# Patient Record
Sex: MALE | Race: WHITE | Employment: OTHER
[De-identification: names, ages, dates, MRNs, and addresses within clinical notes are randomized per-mention and may not be internally consistent; named-entity substitution may affect disease eponyms.]

---

## 2018-07-18 LAB
ALT SERPL-CCNC: 24 IU/L (ref 6–40)
AST SERPL-CCNC: 24 IU/L (ref 6–40)
CHOLEST SERPL-MCNC: 196 MG/DL (ref 114–200)
GLUCOSE SERPL-MCNC: 92 MG/DL (ref 70–100)
HDLC SERPL-MCNC: 50 MG/DL (ref 40–60)
LDLC SERPL CALC-MCNC: 132 MG/DL
TRIGL SERPL-MCNC: 71 MG/DL (ref 10–200)
TSH SERPL-ACNC: 1.85 UIU/ML (ref 0.4–3.99)

## 2019-02-07 ENCOUNTER — RESULTS ONLY (OUTPATIENT)
Dept: LAB | Age: 59
End: 2019-02-07

## 2019-02-07 ENCOUNTER — TRANSFERRED RECORDS (OUTPATIENT)
Dept: HEALTH INFORMATION MANAGEMENT | Facility: CLINIC | Age: 59
End: 2019-02-07

## 2019-02-07 LAB
APPEARANCE FLD: NORMAL
COLOR FLD: NORMAL
CRYSTALS SNV MICRO: NORMAL
GRAM STN SPEC: NORMAL
GRAM STN SPEC: NORMAL
Lab: NORMAL
MONOS+MACROS NFR FLD MANUAL: 12 %
NEUTS BAND NFR FLD MANUAL: 88 %
RBC # FLD: NORMAL /UL
SPECIMEN SOURCE FLD: NORMAL
SPECIMEN SOURCE: NORMAL
WBC # FLD AUTO: NORMAL /UL

## 2019-02-21 LAB
BACTERIA SPEC CULT: NO GROWTH
Lab: NORMAL
SPECIMEN SOURCE: NORMAL

## 2019-05-07 ENCOUNTER — TRANSFERRED RECORDS (OUTPATIENT)
Dept: HEALTH INFORMATION MANAGEMENT | Facility: CLINIC | Age: 59
End: 2019-05-07

## 2019-05-07 LAB
CREAT SERPL-MCNC: 0.98 MG/DL (ref 0.7–1.2)
GFR SERPL CREATININE-BSD FRML MDRD: >60 ML/MIN/1.73M2
GLUCOSE SERPL-MCNC: 100 MG/DL (ref 70–99)
INR PPP: 0.9 (ref 0.9–1.1)
POTASSIUM SERPL-SCNC: 4.4 MEQ/L (ref 3.4–5.1)

## 2019-10-16 SDOH — HEALTH STABILITY: MENTAL HEALTH: HOW OFTEN DO YOU HAVE 6 OR MORE DRINKS ON ONE OCCASION?: WEEKLY

## 2019-10-16 SDOH — HEALTH STABILITY: MENTAL HEALTH: HOW MANY STANDARD DRINKS CONTAINING ALCOHOL DO YOU HAVE ON A TYPICAL DAY?: 1 OR 2

## 2019-10-16 NOTE — PROGRESS NOTES
Subjective     Fam West is a 59 year old male who presents to clinic today for the following health issues:    HPI   derm      Duration: couple months    Description (location/character/radiation): right forearm    Intensity:  mild    Accompanying signs and symptoms: sore with pressure and looks to have a white fluid under the skin.     History (similar episodes/previous evaluation): None    Precipitating or alleviating factors: None    Therapies tried and outcome: None       Patient Active Problem List   Diagnosis     Allergic rhinitis due to other allergen     Genital warts     Herpes simplex virus (HSV) infection     Hyperlipidemia     Primary localized osteoarthrosis of shoulder region     Past Surgical History:   Procedure Laterality Date     ARTHROPLASTY SHOULDER Left 2012     ARTHROPLASTY SHOULDER Left 2019    Removal of cement spacer, Dr Russell     ARTHROSCOPY KNEE Right 10/23/2000     AS REPAIR OF NASAL SEPTUM       COLONOSCOPY  2010    Dr Narayanan, repeat        Social History     Tobacco Use     Smoking status: Former Smoker     Packs/day: 0.00     Years: 15.00     Pack years: 0.00     Types: Cigars     Start date: 1985     Last attempt to quit: 2003     Years since quittin.8     Smokeless tobacco: Never Used     Tobacco comment: smoked cigars every now and then   Substance Use Topics     Alcohol use: Yes     Drinks per session: 1 or 2     Binge frequency: Weekly     Family History   Problem Relation Age of Onset     Diabetes Father          Current Outpatient Medications   Medication Sig Dispense Refill     [START ON 10/31/2019] imiquimod (ALDARA) 5 % external cream Apply topically twice a week 24 packet 11     valACYclovir (VALTREX) 500 MG tablet Take 1 tablet (500 mg) by mouth 2 times daily for 3 days 6 tablet 0     valACYclovir HCl (VALTREX PO)        Allergies   Allergen Reactions     Seasonal Allergies        PROBLEMS TO ADD ON...  Reviewed and updated  as needed this visit by Provider         Review of Systems   ROS COMP: Constitutional, HEENT, cardiovascular, pulmonary, gi and gu systems are negative, except as otherwise noted.      Objective    There were no vitals taken for this visit.  There is no height or weight on file to calculate BMI.  Physical Exam   GENERAL: healthy, alert and no distress  NECK: no adenopathy, no asymmetry, masses, or scars and thyroid normal to palpation  RESP: lungs clear to auscultation - no rales, rhonchi or wheezes  CV: regular rate and rhythm, normal S1 S2, no S3 or S4, no murmur, click or rub, no peripheral edema and peripheral pulses strong  ABDOMEN: soft, nontender, no hepatosplenomegaly, no masses and bowel sounds normal  MS: no gross musculoskeletal defects noted, no edema  SKIN: no suspicious lesions or rashes and small cystic lesion left forearm, and small keratotic lesion right forearm.     Diagnostic Test Results:  Labs reviewed in Epic        Assessment & Plan       ICD-10-CM    1. Skin lesion L98.9 imiquimod (ALDARA) 5 % external cream   2. Herpes simplex virus infection B00.9 valACYclovir (VALTREX) 500 MG tablet   3. Hyperlipidemia, unspecified hyperlipidemia type E78.5 Lipid Profile (Chol, Trig, HDL, LDL calc)     Comprehensive metabolic panel (BMP + Alb, Alk Phos, ALT, AST, Total. Bili, TP)   4. Special screening for malignant neoplasm of prostate Z12.5 PSA, screen     Review today.  45 minutes spent with patient over 50% in counseling about his health cares moving forward, his skin lesions, ongoing medicines (remote hx of genital herpes in the 80's; suppression with aldara twice weekly and valtrex on hand for flare).  Observe the 2 above skin lesions.  They have been there for a long time.  Talked about health maintenance, etc.  He is setting up for a physical and will have labs before.  His lipids were high a few years ago and will be rechecked.     BMI:   Estimated body mass index is 25.92 kg/m  as calculated from  "the following:    Height as of this encounter: 1.77 m (5' 9.69\").    Weight as of this encounter: 81.2 kg (179 lb).               No follow-ups on file.    Anant Evans MD  [facility]      "

## 2019-10-29 ENCOUNTER — OFFICE VISIT (OUTPATIENT)
Dept: FAMILY MEDICINE | Facility: OTHER | Age: 59
End: 2019-10-29
Attending: FAMILY MEDICINE
Payer: COMMERCIAL

## 2019-10-29 VITALS
HEART RATE: 77 BPM | DIASTOLIC BLOOD PRESSURE: 90 MMHG | HEIGHT: 70 IN | SYSTOLIC BLOOD PRESSURE: 144 MMHG | OXYGEN SATURATION: 95 % | BODY MASS INDEX: 25.62 KG/M2 | WEIGHT: 179 LBS

## 2019-10-29 DIAGNOSIS — E78.5 HYPERLIPIDEMIA, UNSPECIFIED HYPERLIPIDEMIA TYPE: ICD-10-CM

## 2019-10-29 DIAGNOSIS — Z12.5 SPECIAL SCREENING FOR MALIGNANT NEOPLASM OF PROSTATE: ICD-10-CM

## 2019-10-29 DIAGNOSIS — B00.9 HERPES SIMPLEX VIRUS INFECTION: ICD-10-CM

## 2019-10-29 DIAGNOSIS — L98.9 SKIN LESION: Primary | ICD-10-CM

## 2019-10-29 PROCEDURE — 99204 OFFICE O/P NEW MOD 45 MIN: CPT | Performed by: FAMILY MEDICINE

## 2019-10-29 RX ORDER — IMIQUIMOD 12.5 MG/.25G
CREAM TOPICAL
Qty: 24 PACKET | Refills: 11 | Status: SHIPPED | OUTPATIENT
Start: 2019-10-31 | End: 2020-10-29

## 2019-10-29 RX ORDER — VALACYCLOVIR HYDROCHLORIDE 500 MG/1
500 TABLET, FILM COATED ORAL 2 TIMES DAILY
Qty: 6 TABLET | Refills: 0 | Status: SHIPPED | OUTPATIENT
Start: 2019-10-29 | End: 2019-12-05

## 2019-10-29 RX ORDER — IMIQUIMOD 12.5 MG/.25G
CREAM TOPICAL
COMMUNITY
Start: 2019-06-09 | End: 2019-10-29

## 2019-10-29 ASSESSMENT — MIFFLIN-ST. JEOR: SCORE: 1628.19

## 2019-10-29 ASSESSMENT — PAIN SCALES - GENERAL: PAINLEVEL: NO PAIN (0)

## 2019-10-29 NOTE — NURSING NOTE
"Chief Complaint   Patient presents with     Establish Care       Initial BP (!) 156/104   Pulse 77   Ht 1.77 m (5' 9.69\")   Wt 81.2 kg (179 lb)   SpO2 95%   BMI 25.92 kg/m   Estimated body mass index is 25.92 kg/m  as calculated from the following:    Height as of this encounter: 1.77 m (5' 9.69\").    Weight as of this encounter: 81.2 kg (179 lb).  Medication Reconciliation: complete  Kaci Rae MA    "

## 2019-11-12 DIAGNOSIS — Z12.5 SPECIAL SCREENING FOR MALIGNANT NEOPLASM OF PROSTATE: ICD-10-CM

## 2019-11-12 DIAGNOSIS — E78.5 HYPERLIPIDEMIA, UNSPECIFIED HYPERLIPIDEMIA TYPE: ICD-10-CM

## 2019-11-12 LAB
ALBUMIN SERPL-MCNC: 4.3 G/DL (ref 3.4–5)
ALP SERPL-CCNC: 68 U/L (ref 40–150)
ALT SERPL W P-5'-P-CCNC: 32 U/L (ref 0–70)
ANION GAP SERPL CALCULATED.3IONS-SCNC: 3 MMOL/L (ref 3–14)
AST SERPL W P-5'-P-CCNC: 19 U/L (ref 0–45)
BILIRUB SERPL-MCNC: 0.5 MG/DL (ref 0.2–1.3)
BUN SERPL-MCNC: 16 MG/DL (ref 7–30)
CALCIUM SERPL-MCNC: 9.5 MG/DL (ref 8.5–10.1)
CHLORIDE SERPL-SCNC: 103 MMOL/L (ref 94–109)
CHOLEST SERPL-MCNC: 260 MG/DL
CO2 SERPL-SCNC: 30 MMOL/L (ref 20–32)
CREAT SERPL-MCNC: 0.94 MG/DL (ref 0.66–1.25)
GFR SERPL CREATININE-BSD FRML MDRD: 87 ML/MIN/{1.73_M2}
GLUCOSE SERPL-MCNC: 101 MG/DL (ref 70–99)
HDLC SERPL-MCNC: 65 MG/DL
LDLC SERPL CALC-MCNC: 172 MG/DL
NONHDLC SERPL-MCNC: 195 MG/DL
POTASSIUM SERPL-SCNC: 4.2 MMOL/L (ref 3.4–5.3)
PROT SERPL-MCNC: 8.3 G/DL (ref 6.8–8.8)
PSA SERPL-ACNC: 0.48 UG/L (ref 0–4)
SODIUM SERPL-SCNC: 136 MMOL/L (ref 133–144)
TRIGL SERPL-MCNC: 117 MG/DL

## 2019-11-12 PROCEDURE — 80053 COMPREHEN METABOLIC PANEL: CPT | Performed by: FAMILY MEDICINE

## 2019-11-12 PROCEDURE — 36415 COLL VENOUS BLD VENIPUNCTURE: CPT | Performed by: FAMILY MEDICINE

## 2019-11-12 PROCEDURE — G0103 PSA SCREENING: HCPCS | Performed by: FAMILY MEDICINE

## 2019-11-12 PROCEDURE — 80061 LIPID PANEL: CPT | Performed by: FAMILY MEDICINE

## 2019-11-25 NOTE — PROGRESS NOTES
3  SUBJECTIVE:   CC: Fam West is an 59 year old male who presents for preventive health visit.     Healthy Habits:    Do you get at least three servings of calcium containing foods daily (dairy, green leafy vegetables, etc.)? yes    Amount of exercise or daily activities, outside of work: 3 day(s) per week    Problems taking medications regularly not applicable    Medication side effects: No    Have you had an eye exam in the past two years? yes    Do you see a dentist twice per year? yes    Do you have sleep apnea, excessive snoring or daytime drowsiness?snoring        Today's PHQ-2 Score:   PHQ-2 (  [employer]) 2019 10/29/2019   Q1: Little interest or pleasure in doing things 0 0   Q2: Feeling down, depressed or hopeless 0 0   PHQ-2 Score 0 0       Abuse: Current or Past(Physical, Sexual or Emotional)- No  Do you feel safe in your environment? Yes    Have you ever done Advance Care Planning? (For example, a Health Directive, POLST, or a discussion with a medical provider or your loved ones about your wishes): Yes, patient states has an Advance Care Planning document and will bring a copy to the clinic.    Social History     Tobacco Use     Smoking status: Former Smoker     Packs/day: 0.00     Years: 15.00     Pack years: 0.00     Types: Cigars     Start date: 1985     Last attempt to quit: 2003     Years since quittin.9     Smokeless tobacco: Never Used     Tobacco comment: smoked cigars every now and then   Substance Use Topics     Alcohol use: Yes     Drinks per session: 1 or 2     Binge frequency: Weekly     If you drink alcohol do you typically have >3 drinks per day or >7 drinks per week? No                      Last PSA:   PSA   Date Value Ref Range Status   2019 0.48 0 - 4 ug/L Final     Comment:     Assay Method:  Chemiluminescence using Siemens Vista analyzer       Reviewed orders with patient. Reviewed health maintenance and updated orders accordingly - Yes  Labs  "reviewed in EPIC    Reviewed and updated as needed this visit by clinical staff  Tobacco  Allergies  Meds         Reviewed and updated as needed this visit by Provider        No past medical history on file.   Past Surgical History:   Procedure Laterality Date     ARTHROPLASTY SHOULDER Left 04/03/2012     ARTHROPLASTY SHOULDER Left 05/13/2019    Removal of cement spacer, Dr Russell     ARTHROSCOPY KNEE Right 10/23/2000     AS REPAIR OF NASAL SEPTUM       COLONOSCOPY  04/09/2010    Dr Narayanan, repeat 2020       ROS:  CONSTITUTIONAL: NEGATIVE for fever, chills, change in weight  INTEGUMENTARY/SKIN: NEGATIVE for worrisome rashes, moles or lesions  EYES: NEGATIVE for vision changes or irritation  ENT: NEGATIVE for ear, mouth and throat problems  RESP: NEGATIVE for significant cough or SOB  CV: NEGATIVE for chest pain, palpitations or peripheral edema  GI: NEGATIVE for nausea, abdominal pain, heartburn, or change in bowel habits   male: negative for dysuria, hematuria, decreased urinary stream, erectile dysfunction, urethral discharge  MUSCULOSKELETAL: NEGATIVE for significant arthralgias or myalgia  NEURO: NEGATIVE for weakness, dizziness or paresthesias  PSYCHIATRIC: NEGATIVE for changes in mood or affect    OBJECTIVE:   /84   Pulse 71   Ht 1.791 m (5' 10.5\")   Wt 83.9 kg (185 lb)   SpO2 97%   BMI 26.17 kg/m    EXAM:  GENERAL: healthy, alert and no distress  EYES: Eyes grossly normal to inspection, PERRL and conjunctivae and sclerae normal  HENT: ear canals and TM's normal, nose and mouth without ulcers or lesions  NECK: no adenopathy, no asymmetry, masses, or scars and thyroid normal to palpation  RESP: lungs clear to auscultation - no rales, rhonchi or wheezes  CV: regular rate and rhythm, normal S1 S2, no S3 or S4, no murmur, click or rub, no peripheral edema and peripheral pulses strong  ABDOMEN: soft, nontender, no hepatosplenomegaly, no masses and bowel sounds normal   (male): normal male " "genitalia without lesions or urethral discharge, no hernia  RECTAL: normal sphincter tone, no rectal masses, prostate normal size, smooth, nontender without nodules or masses  MS: no gross musculoskeletal defects noted, no edema  SKIN: no suspicious lesions or rashes  NEURO: Normal strength and tone, mentation intact and speech normal  PSYCH: mentation appears normal, affect normal/bright    Diagnostic Test Results:  Labs reviewed in Epic    ASSESSMENT/PLAN:       ICD-10-CM    1. Routine general medical examination at a health care facility Z00.00    2. Need for hepatitis C screening test Z11.59    3. Screening for HIV (human immunodeficiency virus) Z11.4    4. Hyperlipidemia, unspecified hyperlipidemia type E78.5 Lipid Profile (Chol, Trig, HDL, LDL calc)       COUNSELING:  Reviewed preventive health counseling, as reflected in patient instructions    Estimated body mass index is 26.17 kg/m  as calculated from the following:    Height as of this encounter: 1.791 m (5' 10.5\").    Weight as of this encounter: 83.9 kg (185 lb).         reports that he quit smoking about 16 years ago. His smoking use included cigars. He started smoking about 34 years ago. He smoked 0.00 packs per day for 15.00 years. He has never used smokeless tobacco.      Counseling Resources:  ATP IV Guidelines  Pooled Cohorts Equation Calculator  FRAX Risk Assessment  ICSI Preventive Guidelines  Dietary Guidelines for Americans, 2010  USDA's MyPlate  ASA Prophylaxis  Lung CA Screening    Anant Evans MD  [facility]  "

## 2019-12-05 ENCOUNTER — OFFICE VISIT (OUTPATIENT)
Dept: FAMILY MEDICINE | Facility: OTHER | Age: 59
End: 2019-12-05
Attending: FAMILY MEDICINE
Payer: COMMERCIAL

## 2019-12-05 VITALS
BODY MASS INDEX: 25.9 KG/M2 | HEART RATE: 71 BPM | DIASTOLIC BLOOD PRESSURE: 84 MMHG | HEIGHT: 71 IN | SYSTOLIC BLOOD PRESSURE: 128 MMHG | OXYGEN SATURATION: 97 % | WEIGHT: 185 LBS

## 2019-12-05 DIAGNOSIS — E78.5 HYPERLIPIDEMIA, UNSPECIFIED HYPERLIPIDEMIA TYPE: ICD-10-CM

## 2019-12-05 DIAGNOSIS — Z00.00 ROUTINE GENERAL MEDICAL EXAMINATION AT A HEALTH CARE FACILITY: Primary | ICD-10-CM

## 2019-12-05 DIAGNOSIS — Z11.59 NEED FOR HEPATITIS C SCREENING TEST: ICD-10-CM

## 2019-12-05 DIAGNOSIS — Z11.4 SCREENING FOR HIV (HUMAN IMMUNODEFICIENCY VIRUS): ICD-10-CM

## 2019-12-05 PROCEDURE — 99396 PREV VISIT EST AGE 40-64: CPT | Performed by: FAMILY MEDICINE

## 2019-12-05 ASSESSMENT — MIFFLIN-ST. JEOR: SCORE: 1668.34

## 2019-12-05 ASSESSMENT — PAIN SCALES - GENERAL: PAINLEVEL: NO PAIN (0)

## 2019-12-05 NOTE — NURSING NOTE
"Chief Complaint   Patient presents with     Physical       Initial /84   Pulse 71   Ht 1.791 m (5' 10.5\")   Wt 83.9 kg (185 lb)   SpO2 97%   BMI 26.17 kg/m   Estimated body mass index is 26.17 kg/m  as calculated from the following:    Height as of this encounter: 1.791 m (5' 10.5\").    Weight as of this encounter: 83.9 kg (185 lb).  Medication Reconciliation: complete  Linda Tran LPN  "

## 2020-10-29 DIAGNOSIS — L98.9 SKIN LESION: ICD-10-CM

## 2020-10-29 RX ORDER — IMIQUIMOD 12.5 MG/.25G
CREAM TOPICAL
Qty: 24 PACKET | Refills: 11 | Status: SHIPPED | OUTPATIENT
Start: 2020-10-29 | End: 2022-01-19

## 2020-10-29 NOTE — TELEPHONE ENCOUNTER
Rhonda      Last Written Prescription Date:  10.31.19  Last Fill Quantity: 24 packets,   # refills: 11  Last Office Visit: 12.05.19

## 2022-01-17 ENCOUNTER — LAB (OUTPATIENT)
Dept: LAB | Facility: OTHER | Age: 62
End: 2022-01-17
Payer: COMMERCIAL

## 2022-01-17 DIAGNOSIS — Z00.00 HEALTHCARE MAINTENANCE: ICD-10-CM

## 2022-01-17 DIAGNOSIS — Z12.5 SCREENING FOR PROSTATE CANCER: ICD-10-CM

## 2022-01-17 DIAGNOSIS — E78.5 HYPERLIPIDEMIA, UNSPECIFIED HYPERLIPIDEMIA TYPE: Primary | ICD-10-CM

## 2022-01-17 DIAGNOSIS — E78.5 HYPERLIPIDEMIA, UNSPECIFIED HYPERLIPIDEMIA TYPE: ICD-10-CM

## 2022-01-17 LAB
ALBUMIN SERPL-MCNC: 3.8 G/DL (ref 3.4–5)
ALP SERPL-CCNC: 69 U/L (ref 40–150)
ALT SERPL W P-5'-P-CCNC: 38 U/L (ref 0–70)
ANION GAP SERPL CALCULATED.3IONS-SCNC: 7 MMOL/L (ref 3–14)
AST SERPL W P-5'-P-CCNC: 23 U/L (ref 0–45)
BILIRUB SERPL-MCNC: 0.5 MG/DL (ref 0.2–1.3)
BUN SERPL-MCNC: 21 MG/DL (ref 7–30)
CALCIUM SERPL-MCNC: 9 MG/DL (ref 8.5–10.1)
CHLORIDE BLD-SCNC: 102 MMOL/L (ref 94–109)
CHOLEST SERPL-MCNC: 210 MG/DL
CO2 SERPL-SCNC: 28 MMOL/L (ref 20–32)
CREAT SERPL-MCNC: 1.01 MG/DL (ref 0.66–1.25)
ERYTHROCYTE [DISTWIDTH] IN BLOOD BY AUTOMATED COUNT: 12.6 % (ref 10–15)
FASTING STATUS PATIENT QL REPORTED: YES
GFR SERPL CREATININE-BSD FRML MDRD: 84 ML/MIN/1.73M2
GLUCOSE BLD-MCNC: 86 MG/DL (ref 70–99)
HCT VFR BLD AUTO: 49.2 % (ref 40–53)
HDLC SERPL-MCNC: 53 MG/DL
HGB BLD-MCNC: 16.8 G/DL (ref 13.3–17.7)
LDLC SERPL CALC-MCNC: 145 MG/DL
MCH RBC QN AUTO: 30.8 PG (ref 26.5–33)
MCHC RBC AUTO-ENTMCNC: 34.1 G/DL (ref 31.5–36.5)
MCV RBC AUTO: 90 FL (ref 78–100)
NONHDLC SERPL-MCNC: 157 MG/DL
PLATELET # BLD AUTO: 207 10E3/UL (ref 150–450)
POTASSIUM BLD-SCNC: 3.9 MMOL/L (ref 3.4–5.3)
PROT SERPL-MCNC: 7.6 G/DL (ref 6.8–8.8)
PSA SERPL-MCNC: 0.66 UG/L (ref 0–4)
RBC # BLD AUTO: 5.45 10E6/UL (ref 4.4–5.9)
SODIUM SERPL-SCNC: 137 MMOL/L (ref 133–144)
TRIGL SERPL-MCNC: 61 MG/DL
WBC # BLD AUTO: 3.3 10E3/UL (ref 4–11)

## 2022-01-17 PROCEDURE — G0103 PSA SCREENING: HCPCS

## 2022-01-17 PROCEDURE — 80061 LIPID PANEL: CPT

## 2022-01-17 PROCEDURE — 87389 HIV-1 AG W/HIV-1&-2 AB AG IA: CPT

## 2022-01-17 PROCEDURE — 85027 COMPLETE CBC AUTOMATED: CPT

## 2022-01-17 PROCEDURE — 80053 COMPREHEN METABOLIC PANEL: CPT

## 2022-01-17 PROCEDURE — 36415 COLL VENOUS BLD VENIPUNCTURE: CPT

## 2022-01-17 PROCEDURE — 86803 HEPATITIS C AB TEST: CPT

## 2022-01-18 LAB
HCV AB SERPL QL IA: NONREACTIVE
HIV 1+2 AB+HIV1 P24 AG SERPL QL IA: NONREACTIVE

## 2022-01-19 ENCOUNTER — OFFICE VISIT (OUTPATIENT)
Dept: FAMILY MEDICINE | Facility: OTHER | Age: 62
End: 2022-01-19
Attending: FAMILY MEDICINE
Payer: COMMERCIAL

## 2022-01-19 VITALS
HEART RATE: 92 BPM | SYSTOLIC BLOOD PRESSURE: 118 MMHG | HEIGHT: 70 IN | BODY MASS INDEX: 25.34 KG/M2 | DIASTOLIC BLOOD PRESSURE: 78 MMHG | TEMPERATURE: 98 F | WEIGHT: 177 LBS | OXYGEN SATURATION: 97 %

## 2022-01-19 DIAGNOSIS — L98.9 SKIN LESION: ICD-10-CM

## 2022-01-19 DIAGNOSIS — Z12.11 COLON CANCER SCREENING: ICD-10-CM

## 2022-01-19 DIAGNOSIS — Z12.5 SCREENING FOR PROSTATE CANCER: ICD-10-CM

## 2022-01-19 DIAGNOSIS — A63.0 GENITAL WARTS: ICD-10-CM

## 2022-01-19 DIAGNOSIS — E78.5 HYPERLIPIDEMIA, UNSPECIFIED HYPERLIPIDEMIA TYPE: ICD-10-CM

## 2022-01-19 DIAGNOSIS — D70.9 NEUTROPENIA, UNSPECIFIED TYPE (H): ICD-10-CM

## 2022-01-19 DIAGNOSIS — Z00.00 ROUTINE GENERAL MEDICAL EXAMINATION AT A HEALTH CARE FACILITY: Primary | ICD-10-CM

## 2022-01-19 PROCEDURE — 99396 PREV VISIT EST AGE 40-64: CPT | Performed by: FAMILY MEDICINE

## 2022-01-19 RX ORDER — IMIQUIMOD 12.5 MG/.25G
CREAM TOPICAL
Qty: 24 PACKET | Refills: 11 | Status: SHIPPED | OUTPATIENT
Start: 2022-01-20 | End: 2023-02-14

## 2022-01-19 ASSESSMENT — MIFFLIN-ST. JEOR: SCORE: 1601.18

## 2022-01-19 ASSESSMENT — ANXIETY QUESTIONNAIRES
1. FEELING NERVOUS, ANXIOUS, OR ON EDGE: NOT AT ALL
IF YOU CHECKED OFF ANY PROBLEMS ON THIS QUESTIONNAIRE, HOW DIFFICULT HAVE THESE PROBLEMS MADE IT FOR YOU TO DO YOUR WORK, TAKE CARE OF THINGS AT HOME, OR GET ALONG WITH OTHER PEOPLE: NOT DIFFICULT AT ALL
5. BEING SO RESTLESS THAT IT IS HARD TO SIT STILL: NOT AT ALL
7. FEELING AFRAID AS IF SOMETHING AWFUL MIGHT HAPPEN: NOT AT ALL
3. WORRYING TOO MUCH ABOUT DIFFERENT THINGS: NOT AT ALL
6. BECOMING EASILY ANNOYED OR IRRITABLE: NOT AT ALL
GAD7 TOTAL SCORE: 0
2. NOT BEING ABLE TO STOP OR CONTROL WORRYING: NOT AT ALL

## 2022-01-19 ASSESSMENT — PAIN SCALES - GENERAL: PAINLEVEL: NO PAIN (0)

## 2022-01-19 ASSESSMENT — PATIENT HEALTH QUESTIONNAIRE - PHQ9
5. POOR APPETITE OR OVEREATING: NOT AT ALL
SUM OF ALL RESPONSES TO PHQ QUESTIONS 1-9: 2

## 2022-01-19 NOTE — PATIENT INSTRUCTIONS
Patient Education     Genital HPV: Diagnosis and Treatment  HPV (human papillomavirus)is a virus. Genital HPV is often found during a routine exam. Your healthcare provider may ask if you are sexually active, and if you have had abnormal development of cells (dysplasia) or genital warts before. You may also be checked for signs of other sexually transmitted infections. Genital HPV can t be removed completely from the body, but it can be treated.   Your exam    A Pap test can show signs of dysplasia or warts on the cervix or in the anus. A sample of cells is taken and viewed under a microscope.    A colposcopy may be done to assess dysplasia more closely. A magnifying scope (colposcope) is used to look at the cervix through the vagina. In the anus, this test is called high-resolution anoscopy (HRA).    An acetowhite test makes HPV disease (dysplasia or warts) easier to see. Vinegar is put on the cervix or anal skin that may be affected. If dysplasia or warts are present, they turn white. This test may be done during a colposcopy.    An HPV DNA test can be used to help diagnose HPV infection and find out which strain of HPV you have. Certain strains of HPV put you at higher risk of developing cancer in the future. A positive or negative HPV DNA test will be used to decide on your treatment plan.    If you have genital warts  The strains of HPV that cause warts are often not the same strains that lead to cancer. If you have genital warts, report them to your healthcare provider. Be aware that genital warts:     Can appear alone or in groups, and may be hard to see    May feel like dry, firm bumps and look like a rash    May look different on skin than on mucous membrane    May look different on a woman than on a man    Can also involve the anus and skin around the anus. Warts or cancer can develop at these sites if HPV is not diagnosed and treated.  Your treatment  No treatment can completely rid your body of HPV,  but it can be treated well in most cases Treating dysplasia removes the cells that can lead to cancer. Treating warts may help to keep them under control and they may not come back. Treatment will also help you to prevent spreading the virus to others.   Types of treatment    Dysplasia or warts can be removed with heat (cautery), freezing, or laser. The procedure is done by your healthcare provider, usually in the doctor's office. The number of treatments you need depends on how much tissue must be removed.    Medicines can be applied to treat external warts. Some medicines prompt your immune system to fight HPV. Others are substances that destroy warts. Medicines may be applied at the healthcare provider's office or at home.    Other treatments are being developed as more is learned about HPV.    Ways to stay healthy  Although some strains of HPV are linked to cervical cancer, most people with HPV don t develop cancer. Following up with your healthcare provider helps reduce the cancer risk even more.   Follow-up care    Schedule follow-up visits as instructed. See your healthcare provider if you notice any new warts.    Have Pap tests as often as your healthcare provider tells you to. This way any dysplasia is found early, when treatment works best and cancer can be prevented.    Keep your immune system strong    Don t smoke. Smoking weakens the immune system, which makes you more likely to get HPV. Smoking also increases the risk for cervical and other types of cancer.    To protect against HPV  If you have sex, the best way to prevent the spread of HPV is to use a latex condom every time. But remember that condoms and other barriers only protect the skin they cover. If you re with someone new, talk about HPV and other sexually transmitted infections before you have sex. If you re in a committed relationship and aren t currently using condoms, you may not need to change your habits. Talk to your partner and make a  choice that feels right to both of you.   One of the best ways to prevent HPV is to make sure you have had the HPV vaccine. It may be given up to age 26 if it not given earlier. If you are older than 27 and have not been vaccinated, talk with your healthcare provider to see if you may need the vaccine. In some cases, it may be offered from ages 27 to 45.   ???? last reviewed this educational content on 6/1/2020 2000-2021 [employer]. All rights reserved. This information is not intended as a substitute for professional medical care. Always follow your healthcare professional's instructions.

## 2022-01-19 NOTE — PROGRESS NOTES
SUBJECTIVE:   CC: Fam West is an 62 year old male who presents for preventative health visit.       Patient has been advised of split billing requirements and indicates understanding: Yes  Healthy Habits:     Getting at least 3 servings of Calcium per day:  NO    Bi-annual eye exam:  Yes    Dental care twice a year:  Yes    Sleep apnea or symptoms of sleep apnea:  Excessive snoring    Diet:  Regular (no restrictions)    Frequency of exercise:  6-7 days/week    Duration of exercise:  45-60 minutes    Taking medications regularly:  Not Applicable    Medication side effects:  Not applicable    PHQ-2 Total Score: 0    Additional concerns today:  No              Today's PHQ-2 Score:   PHQ-2 (  [employer]) 2022   Q1: Little interest or pleasure in doing things 0   Q2: Feeling down, depressed or hopeless 0   PHQ-2 Score 0   PHQ-2 Total Score (12-17 Years)- Positive if 3 or more points; Administer PHQ-A if positive -   Q1: Little interest or pleasure in doing things Not at all   Q2: Feeling down, depressed or hopeless Not at all   PHQ-2 Score 0       Abuse: Current or Past(Physical, Sexual or Emotional)- No  Do you feel safe in your environment? Yes        Social History     Tobacco Use     Smoking status: Former Smoker     Packs/day: 1.00     Years: 15.00     Pack years: 15.00     Types: Cigars     Start date: 1985     Quit date: 2003     Years since quittin.0     Smokeless tobacco: Never Used     Tobacco comment: smoked cigars every now and then   Substance Use Topics     Alcohol use: Yes         Alcohol Use 2022   Prescreen: >3 drinks/day or >7 drinks/week? No       Last PSA:   PSA   Date Value Ref Range Status   2019 0.48 0 - 4 ug/L Final     Comment:     Assay Method:  Chemiluminescence using Siemens Vista analyzer     Prostate Specific Antigen Screen   Date Value Ref Range Status   2022 0.66 0.00 - 4.00 ug/L Final       Reviewed orders with patient. Reviewed health  "maintenance and updated orders accordingly -   Lab work is in process    Reviewed and updated as needed this visit by clinical staff  Tobacco   Meds             Reviewed and updated as needed this visit by Provider               No past medical history on file.   Past Surgical History:   Procedure Laterality Date     ARTHROPLASTY SHOULDER Left 04/03/2012     ARTHROPLASTY SHOULDER Left 05/13/2019    Removal of cement spacer, Dr Russell     ARTHROSCOPY KNEE Right 10/23/2000     AS REPAIR OF NASAL SEPTUM       COLONOSCOPY  04/09/2010    Dr Narayanan, repeat 2020       Review of Systems  CONSTITUTIONAL: NEGATIVE for fever, chills, change in weight  INTEGUMENTARY/SKIN: NEGATIVE for worrisome rashes, moles or lesions  EYES: NEGATIVE for vision changes or irritation  ENT: NEGATIVE for ear, mouth and throat problems  RESP: NEGATIVE for significant cough or SOB  CV: NEGATIVE for chest pain, palpitations or peripheral edema  GI: NEGATIVE for nausea, abdominal pain, heartburn, or change in bowel habits   male: negative for dysuria, hematuria, decreased urinary stream, erectile dysfunction, urethral discharge  MUSCULOSKELETAL: NEGATIVE for significant arthralgias or myalgia  NEURO: NEGATIVE for weakness, dizziness or paresthesias  PSYCHIATRIC: NEGATIVE for changes in mood or affect    OBJECTIVE:   /78   Pulse 92   Temp 98  F (36.7  C)   Ht 1.765 m (5' 9.5\")   Wt 80.3 kg (177 lb)   SpO2 97%   BMI 25.76 kg/m      Physical Exam  GENERAL: healthy, alert and no distress  EYES: Eyes grossly normal to inspection, PERRL and conjunctivae and sclerae normal  HENT: ear canals and TM's normal, nose and mouth without ulcers or lesions  NECK: no adenopathy, no asymmetry, masses, or scars and thyroid normal to palpation  RESP: lungs clear to auscultation - no rales, rhonchi or wheezes  CV: regular rate and rhythm, normal S1 S2, no S3 or S4, no murmur, click or rub, no peripheral edema and peripheral pulses strong  ABDOMEN: " "soft, nontender, no hepatosplenomegaly, no masses and bowel sounds normal   (male): normal male genitalia without lesions or urethral discharge, no hernia  RECTAL: normal sphincter tone, no rectal masses, prostate normal size, smooth, nontender without nodules or masses  MS: no gross musculoskeletal defects noted, no edema  SKIN: no suspicious lesions or rashes  NEURO: Normal strength and tone, mentation intact and speech normal  PSYCH: mentation appears normal, affect normal/bright    Diagnostic Test Results:  Labs reviewed in Epic    ASSESSMENT/PLAN:       ICD-10-CM    1. Routine general medical examination at a health care facility  Z00.00    2. Hyperlipidemia, unspecified hyperlipidemia type  E78.5 Lipid Profile (Chol, Trig, HDL, LDL calc)     Comprehensive metabolic panel (BMP + Alb, Alk Phos, ALT, AST, Total. Bili, TP)   3. Genital warts  A63.0    4. Neutropenia, unspecified type (H)  D70.9 CBC with platelets and differential   5. Colon cancer screening  Z12.11 COLOGUARD([employer])   6. Screening for prostate cancer  Z12.5 PSA, screen           COUNSELING:   Reviewed preventive health counseling, as reflected in patient instructions    Estimated body mass index is 25.76 kg/m  as calculated from the following:    Height as of this encounter: 1.765 m (5' 9.5\").    Weight as of this encounter: 80.3 kg (177 lb).         He reports that he quit smoking about 19 years ago. His smoking use included cigars. He started smoking about 37 years ago. He has a 15.00 pack-year smoking history. He has never used smokeless tobacco.      Counseling Resources:  ATP IV Guidelines  Pooled Cohorts Equation Calculator  FRAX Risk Assessment  ICSI Preventive Guidelines  Dietary Guidelines for Americans, 2010  USDA's MyPlate  ASA Prophylaxis  Lung CA Screening    Anant Evans MD  [facility]  "

## 2022-01-19 NOTE — NURSING NOTE
"Chief Complaint   Patient presents with     Physical       Initial /78   Pulse 92   Temp 98  F (36.7  C)   Ht 1.765 m (5' 9.5\")   Wt 80.3 kg (177 lb)   SpO2 97%   BMI 25.76 kg/m   Estimated body mass index is 25.76 kg/m  as calculated from the following:    Height as of this encounter: 1.765 m (5' 9.5\").    Weight as of this encounter: 80.3 kg (177 lb).  Medication Reconciliation: complete  Linda Tran, LPN  "

## 2022-01-19 NOTE — LETTER
February 7, 2022      Fam ANGEL Jenna  [address]  [place] [address]        Dear ,    We are writing to inform you of your test results.    Cologuard was negative, repeat in 3 years.    Resulted Orders   COLOGUARD([employer])   Result Value Ref Range    COLOGUARD-ABSTRACT Negative        If you have any questions or concerns, please call the clinic at the number listed above.       Sincerely,      Anant Evans MD

## 2022-01-20 ASSESSMENT — ANXIETY QUESTIONNAIRES: GAD7 TOTAL SCORE: 0

## 2022-01-22 ENCOUNTER — HEALTH MAINTENANCE LETTER (OUTPATIENT)
Age: 62
End: 2022-01-22

## 2022-01-31 LAB — COLOGUARD-ABSTRACT: NEGATIVE

## 2022-09-04 ENCOUNTER — HEALTH MAINTENANCE LETTER (OUTPATIENT)
Age: 62
End: 2022-09-04

## 2023-02-13 DIAGNOSIS — L98.9 SKIN LESION: ICD-10-CM

## 2023-02-14 RX ORDER — IMIQUIMOD 12.5 MG/.25G
CREAM TOPICAL
Qty: 24 EACH | Refills: 11 | Status: SHIPPED | OUTPATIENT
Start: 2023-02-16

## 2023-02-14 NOTE — TELEPHONE ENCOUNTER
imiquimod (ALDARA) 5 % external cream   Last Written Prescription Date:  1-20-22  Last Fill Quantity: 24 packet,   # refills: 11  Last Office Visit: 1-19-22  Future Office visit:       Routing refill request to provider for review/approval because:  Drug not on the [facility], P or [facility] refill protocol or controlled substance

## 2023-04-29 ENCOUNTER — HEALTH MAINTENANCE LETTER (OUTPATIENT)
Age: 63
End: 2023-04-29

## 2023-07-31 ENCOUNTER — TELEPHONE (OUTPATIENT)
Dept: FAMILY MEDICINE | Facility: OTHER | Age: 63
End: 2023-07-31

## 2023-07-31 NOTE — TELEPHONE ENCOUNTER
8:15 AM    Reason for Call: OVERBOOK    Patient is having the following symptoms: Blood in stool  for Couple days .    The patient is requesting an appointment for ASAP with Dr. Evans.    Was an appointment offered for this call? Yes  If yes : Appointment type              Date    Preferred method for responding to this message: Telephone Call  What is your phone number ?  [phone]     If we cannot reach you directly, may we leave a detailed response at the number you provided? Yes    Can this message wait until your PCP/provider returns, if unavailable today? Not applicable,     Jasmine Mcdaniel

## 2023-08-01 ENCOUNTER — OFFICE VISIT (OUTPATIENT)
Dept: FAMILY MEDICINE | Facility: OTHER | Age: 63
End: 2023-08-01
Attending: FAMILY MEDICINE
Payer: COMMERCIAL

## 2023-08-01 VITALS
WEIGHT: 185 LBS | BODY MASS INDEX: 26.93 KG/M2 | HEART RATE: 65 BPM | SYSTOLIC BLOOD PRESSURE: 128 MMHG | OXYGEN SATURATION: 99 % | TEMPERATURE: 97 F | DIASTOLIC BLOOD PRESSURE: 88 MMHG

## 2023-08-01 DIAGNOSIS — K92.1 MELENA: ICD-10-CM

## 2023-08-01 DIAGNOSIS — K62.5 BRBPR (BRIGHT RED BLOOD PER RECTUM): Primary | ICD-10-CM

## 2023-08-01 LAB
BASOPHILS # BLD AUTO: 0 10E3/UL (ref 0–0.2)
BASOPHILS NFR BLD AUTO: 1 %
EOSINOPHIL # BLD AUTO: 0.1 10E3/UL (ref 0–0.7)
EOSINOPHIL NFR BLD AUTO: 3 %
ERYTHROCYTE [DISTWIDTH] IN BLOOD BY AUTOMATED COUNT: 12.1 % (ref 10–15)
HCT VFR BLD AUTO: 49.4 % (ref 40–53)
HGB BLD-MCNC: 17.3 G/DL (ref 13.3–17.7)
IMM GRANULOCYTES # BLD: 0 10E3/UL
IMM GRANULOCYTES NFR BLD: 0 %
LYMPHOCYTES # BLD AUTO: 1.3 10E3/UL (ref 0.8–5.3)
LYMPHOCYTES NFR BLD AUTO: 28 %
MCH RBC QN AUTO: 31.1 PG (ref 26.5–33)
MCHC RBC AUTO-ENTMCNC: 35 G/DL (ref 31.5–36.5)
MCV RBC AUTO: 89 FL (ref 78–100)
MONOCYTES # BLD AUTO: 0.6 10E3/UL (ref 0–1.3)
MONOCYTES NFR BLD AUTO: 12 %
NEUTROPHILS # BLD AUTO: 2.6 10E3/UL (ref 1.6–8.3)
NEUTROPHILS NFR BLD AUTO: 56 %
PLATELET # BLD AUTO: 273 10E3/UL (ref 150–450)
RBC # BLD AUTO: 5.57 10E6/UL (ref 4.4–5.9)
WBC # BLD AUTO: 4.6 10E3/UL (ref 4–11)

## 2023-08-01 PROCEDURE — 99214 OFFICE O/P EST MOD 30 MIN: CPT | Performed by: FAMILY MEDICINE

## 2023-08-01 PROCEDURE — 85025 COMPLETE CBC W/AUTO DIFF WBC: CPT | Performed by: FAMILY MEDICINE

## 2023-08-01 PROCEDURE — 36415 COLL VENOUS BLD VENIPUNCTURE: CPT | Performed by: FAMILY MEDICINE

## 2023-08-01 ASSESSMENT — PAIN SCALES - GENERAL: PAINLEVEL: NO PAIN (0)

## 2023-08-01 NOTE — PROGRESS NOTES
Assessment & Plan     BRBPR (bright red blood per rectum)  Improved.  Suspect some type of terminal hemorrhoidal bleed.  However, he had somewhat convincing colitis sx which are now improving.  Had normal cologuard last year.  Would like to be considered for colonoscopy which I agree will have value to r/o other colitis, etc.  With the melena I advised to him that they might do EGD at the same time and I indicated that in the referral note.      Melena  As above.  Continue to monitor.  Cbc pending right now.                   No follow-ups on file.    Anant Evans MD  [facility]   Fam is a 63 year old, presenting for the following health issues:  Rectal Bleeding      HPI     Blood in stool     Duration: Saturday   Description (location/character/radiation): blood in stool   Intensity:  moderate  Accompanying signs and symptoms: dark stools, abdominal discomfort, diarrhea   History (similar episodes/previous evaluation): Last colonoscopy 2010, Negative Cologuard 1/31/23  Precipitating or alleviating factors: None  Therapies tried and outcome: None              Review of Systems   Constitutional, HEENT, cardiovascular, pulmonary, gi and gu systems are negative, except as otherwise noted.      Objective    /88   Pulse 65   Temp 97  F (36.1  C) (Tympanic)   Wt 83.9 kg (185 lb)   SpO2 99%   BMI 26.93 kg/m    Body mass index is 26.93 kg/m .  Physical Exam   GENERAL: healthy, alert and no distress  NECK: no adenopathy, no asymmetry, masses, or scars and thyroid normal to palpation  RESP: lungs clear to auscultation - no rales, rhonchi or wheezes  CV: regular rate and rhythm, normal S1 S2, no S3 or S4, no murmur, click or rub, no peripheral edema and peripheral pulses strong  ABDOMEN: soft, nontender, no hepatosplenomegaly, no masses and bowel sounds normal  MS: no gross musculoskeletal defects noted, no edema

## 2023-08-10 ENCOUNTER — OFFICE VISIT (OUTPATIENT)
Dept: SURGERY | Facility: OTHER | Age: 63
End: 2023-08-10
Attending: SURGERY
Payer: COMMERCIAL

## 2023-08-10 ENCOUNTER — PREP FOR PROCEDURE (OUTPATIENT)
Dept: SURGERY | Facility: OTHER | Age: 63
End: 2023-08-10

## 2023-08-10 VITALS
RESPIRATION RATE: 16 BRPM | OXYGEN SATURATION: 95 % | TEMPERATURE: 97.8 F | WEIGHT: 185 LBS | SYSTOLIC BLOOD PRESSURE: 124 MMHG | HEART RATE: 88 BPM | DIASTOLIC BLOOD PRESSURE: 78 MMHG | HEIGHT: 70 IN | BODY MASS INDEX: 26.48 KG/M2

## 2023-08-10 DIAGNOSIS — K92.1 MELENA: ICD-10-CM

## 2023-08-10 DIAGNOSIS — K62.5 RECTAL BLEEDING: ICD-10-CM

## 2023-08-10 DIAGNOSIS — K92.1 MELENA: Primary | ICD-10-CM

## 2023-08-10 DIAGNOSIS — K62.5 BRBPR (BRIGHT RED BLOOD PER RECTUM): Primary | ICD-10-CM

## 2023-08-10 PROCEDURE — 99204 OFFICE O/P NEW MOD 45 MIN: CPT | Performed by: SURGERY

## 2023-08-10 ASSESSMENT — PAIN SCALES - GENERAL: PAINLEVEL: NO PAIN (0)

## 2023-08-10 NOTE — PATIENT INSTRUCTIONS
Thank you for allowing Dr. Aguilar and our surgical team to participate in your care. Please call our health unit coordinator at [phone] with scheduling questions or the nurse at [phone] with any other questions or concerns.      You have been scheduled for: UPPER ENDOSCOPY COLONOSCOPY with  on SEPT 19TH.   You will use GATORADE bowel prep.  Please see handout for additional instruction.  You WILL NOT need a pre-operative appointment with your primary care provider.  You may call [phone] or [phone] with any questions.

## 2023-08-16 NOTE — PROGRESS NOTES
[facility] Surgery Consultation    CC:  Bleeding with bowel movements, abdominal pain     HPI:  This 63 year old year old male is seen at the request of Dr. Evans for evaluation of bout of abdominal pain and rectal bleeding. He notes over a ten day course he had sever abdominal cramping with PO intake as well as both dark red and bright red blood with bowel movements. Deneis any prior concerns like this. Dis have colonoscopy in . Negative cologuard at beginning of the year.  Denies any other sick contacts. Has been taking some Nsaids for pain. He is not in pain today, denies any specific location for pain. No family history of IBD or colon cancer.     No past medical history on file.    Past Surgical History:   Procedure Laterality Date    ARTHROPLASTY SHOULDER Left 2012    ARTHROPLASTY SHOULDER Left 2019    Removal of cement spacer, Dr Russell    ARTHROSCOPY KNEE Right 10/23/2000    AS REPAIR OF NASAL SEPTUM      COLONOSCOPY  2010    Dr Narayanan, repeat        Allergies   Allergen Reactions    Seasonal Allergies        Current Outpatient Medications   Medication    imiquimod (ALDARA) 5 % external cream    valACYclovir HCl (VALTREX PO)     No current facility-administered medications for this visit.       HABITS:    Social History     Tobacco Use    Smoking status: Former     Packs/day: 1.00     Years: 15.00     Pack years: 15.00     Types: Cigars, Cigarettes     Start date: 1985     Quit date: 2003     Years since quittin.6    Smokeless tobacco: Never    Tobacco comments:     smoked cigars every now and then   Substance Use Topics    Alcohol use: Yes    Drug use: Never       Family History   Problem Relation Age of Onset    Diabetes Father        REVIEW OF SYSTEMS:  Ten point review of systems negative except those mentioned in the HPI.     OBJECTIVE:    /78 (BP Location: Left arm, Cuff Size: Adult Large)   Pulse 88   Temp 97.8  F (36.6  C) (Tympanic)   Resp 16  "  Ht 1.765 m (5' 9.5\")   Wt 83.9 kg (185 lb)   SpO2 95%   BMI 26.93 kg/m      GENERAL: Generally appears well, in no distress with appropriate affect.  HEENT:   Sclerae anicteric - normocephalic atraumatic   Respiratory:  No acute distress, no splinting   Cardiovascular:  Regular Rate and Rhythm  Abdomen: non-distended   :  deferred  Extremities:  Extremities normal. No deformities, edema, or skin discoloration.  Skin:  no suspicious lesions or rashes  Neurological: grossly intact  Psych:  Alert, oriented, affect appropriate with normal decision making ability.    IMPRESSION:    Bout of colitis, differential includes, diverticular, infectious but would like to rule out inflammatory bowel disease with both upper and lower endoscopy. Seems self limited at this point as his symptoms are resolving which is good.     PLAN:    Upper and lower endoscopy     Jaren Aguilar MD,     8/16/2023  10:51 AM      "

## 2023-09-14 ENCOUNTER — ANESTHESIA EVENT (OUTPATIENT)
Dept: SURGERY | Facility: HOSPITAL | Age: 63
End: 2023-09-14
Payer: COMMERCIAL

## 2023-09-14 ASSESSMENT — LIFESTYLE VARIABLES: TOBACCO_USE: 1

## 2023-09-14 NOTE — ANESTHESIA PREPROCEDURE EVALUATION
Anesthesia Pre-Procedure Evaluation    Patient: Fam West   MRN: [id number] : 1960        Procedure : Procedure(s):  COLONOSCOPY POSSIBLE BIOPSY POSSIBLE POLYPECTOMY WITH UPPER GASTROINTESTINAL TRACT ENDOSCOPY POSSIBLE BIOPSY          No past medical history on file.   Past Surgical History:   Procedure Laterality Date     ARTHROPLASTY SHOULDER Left 2012     ARTHROPLASTY SHOULDER Left 2019    Removal of cement spacer, Dr Russell     ARTHROSCOPY KNEE Right 10/23/2000     AS REPAIR OF NASAL SEPTUM       COLONOSCOPY  2010    Dr Narayanan, repeat 2020      Allergies   Allergen Reactions     Seasonal Allergies       Social History     Tobacco Use     Smoking status: Former     Packs/day: 1.00     Years: 15.00     Pack years: 15.00     Types: Cigars, Cigarettes     Start date: 1985     Quit date: 2003     Years since quittin.7     Smokeless tobacco: Never     Tobacco comments:     smoked cigars every now and then   Substance Use Topics     Alcohol use: Yes      Wt Readings from Last 1 Encounters:   08/10/23 83.9 kg (185 lb)        Anesthesia Evaluation   Pt has had prior anesthetic. Type: MAC and General.        ROS/MED HX  ENT/Pulmonary:     (+)           allergic rhinitis,     tobacco use, Past use,                      Neurologic:  - neg neurologic ROS     Cardiovascular:     (+) Dyslipidemia - -   -  - -                                      METS/Exercise Tolerance: >4 METS    Hematologic:  - neg hematologic  ROS     Musculoskeletal:  - neg musculoskeletal ROS     GI/Hepatic: Comment: Bleeding hemrrhoids  diarrhea    (+)        bowel prep,            Renal/Genitourinary:  - neg Renal ROS     Endo:  - neg endo ROS     Psychiatric/Substance Use:  - neg psychiatric ROS     Infectious Disease:  - neg infectious disease ROS     Malignancy:  - neg malignancy ROS     Other:  - neg other ROS          Physical Exam    Airway        Mallampati: II   TM distance: > 3 FB   Neck ROM:  full   Mouth opening: > 3 cm    Respiratory Devices and Support         Dental       (+) Modest Abnormalities - crowns, retainers, 1 or 2 missing teeth      Cardiovascular   cardiovascular exam normal       Rhythm and rate: regular and normal     Pulmonary   pulmonary exam normal        breath sounds clear to auscultation       OUTSIDE LABS:  CBC:   Lab Results   Component Value Date    WBC 4.6 08/01/2023    WBC 3.3 (L) 01/17/2022    HGB 17.3 08/01/2023    HGB 16.8 01/17/2022    HCT 49.4 08/01/2023    HCT 49.2 01/17/2022     08/01/2023     01/17/2022     BMP:   Lab Results   Component Value Date     01/17/2022     11/12/2019    POTASSIUM 3.9 01/17/2022    POTASSIUM 4.2 11/12/2019    CHLORIDE 102 01/17/2022    CHLORIDE 103 11/12/2019    CO2 28 01/17/2022    CO2 30 11/12/2019    BUN 21 01/17/2022    BUN 16 11/12/2019    CR 1.01 01/17/2022    CR 0.94 11/12/2019    GLC 86 01/17/2022     (H) 11/12/2019     COAGS:   Lab Results   Component Value Date    INR 0.9 05/07/2019     POC: No results found for: BGM, HCG, HCGS  HEPATIC:   Lab Results   Component Value Date    ALBUMIN 3.8 01/17/2022    PROTTOTAL 7.6 01/17/2022    ALT 38 01/17/2022    AST 23 01/17/2022    ALKPHOS 69 01/17/2022    BILITOTAL 0.5 01/17/2022     OTHER:   Lab Results   Component Value Date    STACY 9.0 01/17/2022    TSH 1.85 07/18/2018       Anesthesia Plan    ASA Status:  2    NPO Status:  NPO Appropriate    Anesthesia Type: MAC.     - Reason for MAC: straight local not clinically adequate              Consents    Anesthesia Plan(s) and associated risks, benefits, and realistic alternatives discussed. Questions answered and patient/representative(s) expressed understanding.     - Discussed:     - Discussed with:  Patient            Postoperative Care            Comments:    Other Comments: No hp Alejandra today    BP elevated, patient reports white coat syndrome. Patient takes BP at home and all WNL per patient  Precedex for  anxiety preop as he needs HP and consent    Risks and benefits of MAC anesthetic discussed including dental damage, aspiration, loss of airway, conversion to general anesthetic, CV complications, MI, stroke, death. Pt wishes to proceed.             CHARLES Fink CRNA

## 2023-09-19 ENCOUNTER — HOSPITAL ENCOUNTER (OUTPATIENT)
Facility: HOSPITAL | Age: 63
Discharge: HOME | End: 2023-09-19
Attending: SURGERY | Admitting: SURGERY
Payer: COMMERCIAL

## 2023-09-19 ENCOUNTER — ANESTHESIA (OUTPATIENT)
Dept: SURGERY | Facility: HOSPITAL | Age: 63
End: 2023-09-19
Payer: COMMERCIAL

## 2023-09-19 VITALS
TEMPERATURE: 96.8 F | SYSTOLIC BLOOD PRESSURE: 139 MMHG | BODY MASS INDEX: 24.5 KG/M2 | HEART RATE: 71 BPM | RESPIRATION RATE: 18 BRPM | WEIGHT: 175 LBS | HEIGHT: 71 IN | DIASTOLIC BLOOD PRESSURE: 103 MMHG | OXYGEN SATURATION: 99 %

## 2023-09-19 DIAGNOSIS — K29.50 CHRONIC GASTRITIS, PRESENCE OF BLEEDING UNSPECIFIED, UNSPECIFIED GASTRITIS TYPE: Primary | ICD-10-CM

## 2023-09-19 PROCEDURE — 710N000012 HC RECOVERY PHASE 2, PER MINUTE: Performed by: SURGERY

## 2023-09-19 PROCEDURE — 250N000009 HC RX 250: Performed by: NURSE ANESTHETIST, CERTIFIED REGISTERED

## 2023-09-19 PROCEDURE — 45378 DIAGNOSTIC COLONOSCOPY: CPT | Performed by: SURGERY

## 2023-09-19 PROCEDURE — 258N000003 HC RX IP 258 OP 636: Performed by: NURSE ANESTHETIST, CERTIFIED REGISTERED

## 2023-09-19 PROCEDURE — 43239 EGD BIOPSY SINGLE/MULTIPLE: CPT | Performed by: NURSE ANESTHETIST, CERTIFIED REGISTERED

## 2023-09-19 PROCEDURE — 360N000075 HC SURGERY LEVEL 2, PER MIN: Performed by: SURGERY

## 2023-09-19 PROCEDURE — 370N000017 HC ANESTHESIA TECHNICAL FEE, PER MIN: Performed by: SURGERY

## 2023-09-19 PROCEDURE — 43239 EGD BIOPSY SINGLE/MULTIPLE: CPT | Performed by: SURGERY

## 2023-09-19 PROCEDURE — 250N000011 HC RX IP 250 OP 636: Performed by: NURSE ANESTHETIST, CERTIFIED REGISTERED

## 2023-09-19 PROCEDURE — 88305 TISSUE EXAM BY PATHOLOGIST: CPT | Mod: TC | Performed by: SURGERY

## 2023-09-19 PROCEDURE — 250N000009 HC RX 250: Performed by: SURGERY

## 2023-09-19 PROCEDURE — 88305 TISSUE EXAM BY PATHOLOGIST: CPT | Mod: 26 | Performed by: PATHOLOGY

## 2023-09-19 PROCEDURE — 999N000141 HC STATISTIC PRE-PROCEDURE NURSING ASSESSMENT: Performed by: SURGERY

## 2023-09-19 PROCEDURE — 272N000001 HC OR GENERAL SUPPLY STERILE: Performed by: SURGERY

## 2023-09-19 RX ORDER — NALOXONE HYDROCHLORIDE 0.4 MG/ML
0.4 INJECTION, SOLUTION INTRAMUSCULAR; INTRAVENOUS; SUBCUTANEOUS
Status: DISCONTINUED | OUTPATIENT
Start: 2023-09-19 | End: 2023-09-19 | Stop reason: HOSPADM

## 2023-09-19 RX ORDER — NALOXONE HYDROCHLORIDE 0.4 MG/ML
0.2 INJECTION, SOLUTION INTRAMUSCULAR; INTRAVENOUS; SUBCUTANEOUS
Status: DISCONTINUED | OUTPATIENT
Start: 2023-09-19 | End: 2023-09-19 | Stop reason: HOSPADM

## 2023-09-19 RX ORDER — DEXMEDETOMIDINE HYDROCHLORIDE 4 UG/ML
INJECTION, SOLUTION INTRAVENOUS PRN
Status: DISCONTINUED | OUTPATIENT
Start: 2023-09-19 | End: 2023-09-19

## 2023-09-19 RX ORDER — ONDANSETRON 4 MG/1
4 TABLET, ORALLY DISINTEGRATING ORAL EVERY 30 MIN PRN
Status: DISCONTINUED | OUTPATIENT
Start: 2023-09-19 | End: 2023-09-19 | Stop reason: HOSPADM

## 2023-09-19 RX ORDER — FENTANYL CITRATE 50 UG/ML
50 INJECTION, SOLUTION INTRAMUSCULAR; INTRAVENOUS EVERY 5 MIN PRN
Status: DISCONTINUED | OUTPATIENT
Start: 2023-09-19 | End: 2023-09-19 | Stop reason: HOSPADM

## 2023-09-19 RX ORDER — SODIUM CHLORIDE, SODIUM LACTATE, POTASSIUM CHLORIDE, CALCIUM CHLORIDE 600; 310; 30; 20 MG/100ML; MG/100ML; MG/100ML; MG/100ML
INJECTION, SOLUTION INTRAVENOUS CONTINUOUS
Status: DISCONTINUED | OUTPATIENT
Start: 2023-09-19 | End: 2023-09-19 | Stop reason: HOSPADM

## 2023-09-19 RX ORDER — LIDOCAINE HYDROCHLORIDE 20 MG/ML
INJECTION, SOLUTION INFILTRATION; PERINEURAL PRN
Status: DISCONTINUED | OUTPATIENT
Start: 2023-09-19 | End: 2023-09-19

## 2023-09-19 RX ORDER — PROPOFOL 10 MG/ML
INJECTION, EMULSION INTRAVENOUS PRN
Status: DISCONTINUED | OUTPATIENT
Start: 2023-09-19 | End: 2023-09-19

## 2023-09-19 RX ORDER — FLUMAZENIL 0.1 MG/ML
0.2 INJECTION, SOLUTION INTRAVENOUS
Status: DISCONTINUED | OUTPATIENT
Start: 2023-09-19 | End: 2023-09-19 | Stop reason: HOSPADM

## 2023-09-19 RX ORDER — ONDANSETRON 2 MG/ML
4 INJECTION INTRAMUSCULAR; INTRAVENOUS EVERY 30 MIN PRN
Status: DISCONTINUED | OUTPATIENT
Start: 2023-09-19 | End: 2023-09-19 | Stop reason: HOSPADM

## 2023-09-19 RX ORDER — OMEPRAZOLE 40 MG/1
40 CAPSULE, DELAYED RELEASE ORAL DAILY
Qty: 30 CAPSULE | Refills: 3 | Status: SHIPPED | OUTPATIENT
Start: 2023-09-19

## 2023-09-19 RX ORDER — LIDOCAINE 40 MG/G
CREAM TOPICAL
Status: DISCONTINUED | OUTPATIENT
Start: 2023-09-19 | End: 2023-09-19 | Stop reason: HOSPADM

## 2023-09-19 RX ORDER — HYDRALAZINE HYDROCHLORIDE 20 MG/ML
2.5-5 INJECTION INTRAMUSCULAR; INTRAVENOUS EVERY 10 MIN PRN
Status: DISCONTINUED | OUTPATIENT
Start: 2023-09-19 | End: 2023-09-19 | Stop reason: HOSPADM

## 2023-09-19 RX ORDER — LABETALOL 20 MG/4 ML (5 MG/ML) INTRAVENOUS SYRINGE
10
Status: DISCONTINUED | OUTPATIENT
Start: 2023-09-19 | End: 2023-09-19 | Stop reason: HOSPADM

## 2023-09-19 RX ADMIN — DEXMEDETOMIDINE 20 MCG: 100 INJECTION, SOLUTION, CONCENTRATE INTRAVENOUS at 13:25

## 2023-09-19 RX ADMIN — PROPOFOL 30 MG: 10 INJECTION, EMULSION INTRAVENOUS at 13:45

## 2023-09-19 RX ADMIN — LIDOCAINE HYDROCHLORIDE 40 MG: 20 INJECTION, SOLUTION INFILTRATION; PERINEURAL at 13:43

## 2023-09-19 RX ADMIN — PROPOFOL 20 MG: 10 INJECTION, EMULSION INTRAVENOUS at 13:48

## 2023-09-19 RX ADMIN — PROPOFOL 30 MG: 10 INJECTION, EMULSION INTRAVENOUS at 13:46

## 2023-09-19 RX ADMIN — PROPOFOL 30 MG: 10 INJECTION, EMULSION INTRAVENOUS at 13:43

## 2023-09-19 RX ADMIN — PROPOFOL 40 MG: 10 INJECTION, EMULSION INTRAVENOUS at 13:57

## 2023-09-19 RX ADMIN — SODIUM CHLORIDE, POTASSIUM CHLORIDE, SODIUM LACTATE AND CALCIUM CHLORIDE: 600; 310; 30; 20 INJECTION, SOLUTION INTRAVENOUS at 13:35

## 2023-09-19 RX ADMIN — PROPOFOL 20 MG: 10 INJECTION, EMULSION INTRAVENOUS at 13:50

## 2023-09-19 RX ADMIN — PROPOFOL 10 MG: 10 INJECTION, EMULSION INTRAVENOUS at 14:00

## 2023-09-19 RX ADMIN — PROPOFOL 20 MG: 10 INJECTION, EMULSION INTRAVENOUS at 13:52

## 2023-09-19 RX ADMIN — PROPOFOL 20 MG: 10 INJECTION, EMULSION INTRAVENOUS at 14:03

## 2023-09-19 RX ADMIN — MIDAZOLAM 2 MG: 1 INJECTION INTRAMUSCULAR; INTRAVENOUS at 13:40

## 2023-09-19 RX ADMIN — PROPOFOL 40 MG: 10 INJECTION, EMULSION INTRAVENOUS at 13:58

## 2023-09-19 ASSESSMENT — ACTIVITIES OF DAILY LIVING (ADL)
ADLS_ACUITY_SCORE: 33
ADLS_ACUITY_SCORE: 35
ADLS_ACUITY_SCORE: 35

## 2023-09-19 NOTE — ANESTHESIA CARE TRANSFER NOTE
Patient: Fam West    Procedure: Procedure(s):  COLONOSCOPY AND UPPER GASTROINTESTINAL TRACT ENDOSCOPY WITH BIOPSY       Diagnosis: Melena [K92.1]  Rectal bleeding [K62.5]  Diagnosis Additional Information: No value filed.    Anesthesia Type:   MAC     Note:    Oropharynx: oropharynx clear of all foreign objects and spontaneously breathing  Level of Consciousness: drowsy  Oxygen Supplementation: nasal cannula  Level of Supplemental Oxygen (L/min / FiO2): 2  Independent Airway: airway patency satisfactory and stable  Dentition: dentition unchanged  Vital Signs Stable: post-procedure vital signs reviewed and stable  Report to RN Given: handoff report given  Patient transferred to: Phase II    Handoff Report: Identifed the Patient, Identified the Reponsible Provider, Reviewed the pertinent medical history, Discussed the surgical course, Reviewed Intra-OP anesthesia mangement and issues during anesthesia, Set expectations for post-procedure period and Allowed opportunity for questions and acknowledgement of understanding      Vitals:  Vitals Value Taken Time   BP     Temp     Pulse     Resp     SpO2         Electronically Signed By: CHARLES Gay CRNA  September 19, 2023  2:13 PM

## 2023-09-19 NOTE — H&P
[facility] Surgery Consultation    CC:  Bleeding with bowel movements, abdominal pain      HPI:  This 63 year old year old male is seen at the request of Dr. Evans for evaluation of bout of abdominal pain and rectal bleeding. He notes over a ten day course he had sever abdominal cramping with PO intake as well as both dark red and bright red blood with bowel movements. Deneis any prior concerns like this. Dis have colonoscopy in 2010. Negative cologuard at beginning of the year.  Denies any other sick contacts. Has been taking some Nsaids for pain. He is not in pain today, denies any specific location for pain. No family history of IBD or colon cancer.     No past medical history on file.    Past Surgical History:   Procedure Laterality Date    ARTHROPLASTY SHOULDER Left 04/03/2012    ARTHROPLASTY SHOULDER Left 05/13/2019    Removal of cement spacer, Dr Russell    ARTHROSCOPY KNEE Right 10/23/2000    AS REPAIR OF NASAL SEPTUM      COLONOSCOPY  04/09/2010    Dr Narayanan, repeat 2020       Allergies   Allergen Reactions    Seasonal Allergies        Current Facility-Administered Medications   Medication    benzocaine 20% (HURRICAINE/TOPEX) 20 % spray    fentaNYL (PF) (SUBLIMAZE) injection 50 mcg    hydrALAZINE (APRESOLINE) injection 2.5-5 mg    labetalol (NORMODYNE/TRANDATE) syringe 10 mg    lactated ringers infusion    lactated ringers infusion    lidocaine (LMX4) cream    lidocaine 1 % 0.1-1 mL    naloxone (NARCAN) injection 0.2 mg    Or    naloxone (NARCAN) injection 0.4 mg    Or    naloxone (NARCAN) injection 0.2 mg    Or    naloxone (NARCAN) injection 0.4 mg    ondansetron (ZOFRAN ODT) ODT tab 4 mg    Or    ondansetron (ZOFRAN) injection 4 mg    sodium chloride (PF) 0.9% PF flush 3 mL    sodium chloride (PF) 0.9% PF flush 3 mL       HABITS:    Social History     Tobacco Use    Smoking status: Former     Packs/day: 1.00     Years: 15.00     Pack years: 15.00     Types: Cigars, Cigarettes     Start date:  "1985     Quit date: 2003     Years since quittin.7    Smokeless tobacco: Never    Tobacco comments:     smoked cigars every now and then   Substance Use Topics    Alcohol use: Yes    Drug use: Never       Family History   Problem Relation Age of Onset    Diabetes Father        REVIEW OF SYSTEMS:  Ten point review of systems negative except those mentioned in the HPI.     OBJECTIVE:    BP (!) 156/117   Pulse 85   Temp 97.8  F (36.6  C)   Resp 18   Ht 1.803 m (5' 11\")   Wt 79.4 kg (175 lb)   BMI 24.41 kg/m      GENERAL: Generally appears well, in no distress with appropriate affect.  HEENT:   Sclerae anicteric - normocephalic atraumatic   Respiratory:  No acute distress, no splinting, CTAB   Cardiovascular:  Regular Rate and Rhythm, no murmur   Abdomen: non-distended   :  deferred  Extremities:  Extremities normal. No deformities, edema, or skin discoloration.  Skin:  no suspicious lesions or rashes  Neurological: grossly intact  Psych:  Alert, oriented, affect appropriate with normal decision making ability.    IMPRESSION:    Proceed on with upper endoscopy and colonoscopy as planned    PLAN:    See above     Jaren Aguilar MD,     2023  1:33 PM      "

## 2023-09-19 NOTE — DISCHARGE INSTRUCTIONS
"    Post-Anesthesia Patient Instructions    IMMEDIATELY FOLLOWING SURGERY:  Do not drive or operate machinery for the first twenty four hours after surgery.  Do not make any important decisions for twenty four hours after surgery or while taking narcotic pain medications or sedatives.  If you develop intractable nausea and vomiting or a severe headache please notify your doctor immediately.    FOLLOW-UP:  Please make an appointment with your surgeon as instructed. You do not need to follow up with anesthesia unless specifically instructed to do so.    WOUND CARE INSTRUCTIONS (if applicable):  Keep a dry clean dressing on the anesthesia/puncture wound site if there is drainage.  Once the wound has quit draining you may leave it open to air.  Generally you should leave the bandage intact for twenty four hours unless there is drainage.  If the epidural site drains for more than 36-48 hours please call the anesthesia department.    QUESTIONS?:  Please feel free to call your physician or the hospital  if you have any questions, and they will be happy to assist you.                 You had no colon polyps today.  You do have irritation in your lower esophagus.  I sent a prescription for omeprazole to your pharmacy.     Known reflux triggers:  Tobacco  Caffeine (especially coffee, chocolate)  Tomato based foods  Fatty foods (especially fast foods)  Citrus  Peppermint  Alcohol  NSAIDS (aspirin, ibuprofen)  Carbonated beverages      Things to try:  Elevate the head of your bed four inches  Maintain a healthy weight  Refrain from eating within three hours of lying down  Take your acid reducing medication 30\"-60\" before eating the first time each day   "

## 2023-09-19 NOTE — ANESTHESIA POSTPROCEDURE EVALUATION
Patient: Fam West    Procedure: Procedure(s):  COLONOSCOPY AND UPPER GASTROINTESTINAL TRACT ENDOSCOPY WITH BIOPSY       Anesthesia Type:  MAC    Note:  Disposition: Outpatient   Postop Pain Control: Uneventful            Sign Out: Well controlled pain   PONV: No   Neuro/Psych: Uneventful            Sign Out: Acceptable/Baseline neuro status   Airway/Respiratory: Uneventful            Sign Out: Acceptable/Baseline resp. status   CV/Hemodynamics:             Sign Out: Acceptable CV status (BP at baseline, patient asymptomatic)   Other NRE: NONE   DID A NON-ROUTINE EVENT OCCUR? No       Last vitals:  Vitals Value Taken Time   /103 09/19/23 1450   Temp 96.8  F (36  C) 09/19/23 1414   Pulse 71 09/19/23 1450   Resp 18 09/19/23 1450   SpO2 96 % 09/19/23 1454   Vitals shown include unvalidated device data.    Electronically Signed By: CHARLES Gay CRNA  September 19, 2023  3:22 PM

## 2023-09-19 NOTE — OP NOTE
Fam West MRN# [id number]   YOB: 1960 Age: 63 year old      Date of Admission:  9/19/2023  Date of Service:   9/19/23    Primary care provider: Anant Evans    PREOPERATIVE DIAGNOSIS:  Rectal bleeding, abdominal pain        POSTOPERATIVE DIAGNOSIS:  Gastritis, reflux changes. Diverticulosis, hemorrhoids          PROCEDURE:  Colonoscopy and upper endoscopy with biopsy            INDICATIONS:  Screening colonoscopy.      Specimen:   ID Type Source Tests Collected by Time Destination   1 :  Biopsy Stomach, Antrum SURGICAL PATHOLOGY EXAM Jaren Aguilar MD 9/19/2023  1:49 PM    2 :  Biopsy Gastric Esophageal Junction SURGICAL PATHOLOGY EXAM Jaren Aguilar MD 9/19/2023  1:50 PM        SURGEON: Jaren Aguilar MD        Fam West MRN# [id number]   YOB: 1960 Age: 63 year old      Date of Admission:  9/19/2023    Primary care provider: Anant Evans      PROCEDURE:  With the patient in the supine position on the transport cart, IV sedation was administered by the nurse anesthetist.  The patient's correct identity and planned procedure were confirmed during a requisite timeout pause.  A bite block was placed and the fiberoptic endoscope was introduced and negotiated through the cricopharyngeus without difficulty.  The length of the esophagus was examined without findings.  The gastroesophageal junction was noted 40 cm from the incisors; the Z line was irregular without ulceration, bleeding source or stricture.  There was possible  evidence of Molina's change.  The stomach was entered and the pylorus was traversed easily.  The gastric mucosa was irritated mostly in the antrum; there was no evidence of gastric polyp, ulcer or bleeding source.  The duodenum was without finding.  The endoscope was returned to the body of the stomach and retroflex confirmed the absence of abnormality in the cardia.      Random cold forceps biopsies were obtained of the antrum for H.  pylori.  Hemostasis was judged adequate on visualization.   The endoscope was returned to the GE junction.  Circumferential biopsies were obtained; hemostasis was satisfactory.  A second circumferential examination of the esophagus was performed on slow withdrawal of the endoscope without additional finding.      Our attention then turned to the colonoscopy, the external anus was inspected and was noted to have enlarged hemorrhoids . Digital rectal exam was normal. The colonoscope was inserted and advanced under direct visualization to the level of the cecum which was identified by the appendiceal orifice and the ileocecal valve. The terminal ileum was intubated and was normal. The prep was adequate. Upon slow withdrawal of the colonoscope, approximately 95% of the mucosa was directly visualized. There was moderate sigmoid diverticulosis.  The rest of the colon was without mucosal abnormality. There was no evidence of further polyps, inflammation, bleeding or AVMs. Retroflexion of the rectum was normal. The extra air was removed from the colon, and the colonoscope withdrawn. The patient tolerated the procedure well and was taken to postanesthesia care unit.     We invite the patient to return in 10 years for follow up screening evaluation.    Jaren Aguilar MD

## 2023-09-21 LAB
PATH REPORT.COMMENTS IMP SPEC: NORMAL
PATH REPORT.FINAL DX SPEC: NORMAL
PATH REPORT.GROSS SPEC: NORMAL
PATH REPORT.MICROSCOPIC SPEC OTHER STN: NORMAL
PATH REPORT.RELEVANT HX SPEC: NORMAL
PHOTO IMAGE: NORMAL

## 2024-02-12 ENCOUNTER — TRANSFERRED RECORDS (OUTPATIENT)
Dept: HEALTH INFORMATION MANAGEMENT | Facility: CLINIC | Age: 64
End: 2024-02-12

## 2024-03-21 NOTE — PROGRESS NOTES
Preoperative Evaluation  [facility]  [address]  [facility] [address]  Phone: [phone]  Primary Provider: Hans Thompson  Pre-op Performing Provider: HANS THOMPSON  Mar 25, 2024       Fam is a 64 year old, presenting for the following:  Pre-Op Exam      Surgical Information  Surgery/Procedure: right knee surgery   Surgery Location: [facility]   Surgeon: Dr Smith   Surgery Date: 3/27/24  Time of Surgery: TBD  Where patient plans to recover: At home with family  Fax number for surgical facility:     Assessment & Plan     The proposed surgical procedure is considered LOW risk.    Preoperative examination  Very healthy and active man.  Upcoming low risk surgery.  No indication for further workup which can proceed.    - CBC with Platelets & Differential; Future  - Basic metabolic panel; Future  - EKG 12-lead complete w/read - Clinics  - CBC with Platelets & Differential  - Basic metabolic panel    Chronic pain of right knee  Upcoming surgery.     Upper respiratory tract infection, unspecified type  10 days ago.  Tiny residual cough with no other ongoing sx.  Normal oxygen, clear lungs no wheeze.  He will continue to monitor but I assume this is resolving URI.              - No identified additional risk factors other than previously addressed        Recommendation  APPROVAL GIVEN to proceed with proposed procedure, without further diagnostic evaluation.          Subjective       HPI related to upcoming procedure: upcoming knee surgery as above.          3/18/2024    11:58 AM   Preop Questions   1. Have you ever had a heart attack or stroke? No   2. Have you ever had surgery on your heart or blood vessels, such as a stent placement, a coronary artery bypass, or surgery on an artery in your head, neck, heart, or legs? No   3. Do you have chest pain with activity? No   4. Do you have a history of  heart failure? No   5. Do you currently have a cold, bronchitis or symptoms  of other infection? No   6. Do you have a cough, shortness of breath, or wheezing? No   7. Do you or anyone in your family have previous history of blood clots? No   8. Do you or does anyone in your family have a serious bleeding problem such as prolonged bleeding following surgeries or cuts? No   9. Have you ever had problems with anemia or been told to take iron pills? No   10. Have you had any abnormal blood loss such as black, tarry or bloody stools? No   11. Have you ever had a blood transfusion? No   12. Are you willing to have a blood transfusion if it is medically needed before, during, or after your surgery? Yes   13. Have you or any of your relatives ever had problems with anesthesia? No   14. Do you have sleep apnea, excessive snoring or daytime drowsiness? No   15. Do you have any artifical heart valves or other implanted medical devices like a pacemaker, defibrillator, or continuous glucose monitor? No   16. Do you have artificial joints? YES -    17. Are you allergic to latex? No       Health Care Directive  Patient does not have a Health Care Directive or Living Will: Patient states has Advance Directive and will bring in a copy to clinic.    Preoperative Review of         Status of Chronic Conditions:  See problem list for active medical problems.  Problems all longstanding and stable, except as noted/documented.  See ROS for pertinent symptoms related to these conditions.    Patient Active Problem List    Diagnosis Date Noted    Neutropenia, unspecified type (H24) 01/19/2022     Priority: Medium    Hyperlipidemia 02/16/2016     Priority: Medium    Primary localized osteoarthrosis of shoulder region 04/04/2012     Priority: Medium     Overview:   IMO Update 10/11      Herpes simplex virus (HSV) infection 10/12/2011     Priority: Medium     Overview:   IMO Update 10/11    Last Assessment & Plan:   Per office visit 05/24/06      Allergic rhinitis due to other allergen 02/18/2008     Priority: Medium     Genital warts 2008     Priority: Medium      No past medical history on file.  Past Surgical History:   Procedure Laterality Date    ARTHROPLASTY SHOULDER Left 2012    ARTHROPLASTY SHOULDER Left 2019    Removal of cement spacer, Dr Russell    ARTHROSCOPY KNEE Right 10/23/2000    AS REPAIR OF NASAL SEPTUM      COLONOSCOPY  2010    Dr Narayanan, repeat     ENDOSCOPY UPPER, COLONOSCOPY, COMBINED N/A 2023    Procedure: COLONOSCOPY AND UPPER GASTROINTESTINAL TRACT ENDOSCOPY WITH BIOPSY;  Surgeon: Jaren Aguilar MD;  Location: [facility]     Current Outpatient Medications   Medication Sig Dispense Refill    imiquimod (ALDARA) 5 % external cream Apply topically twice a week 24 each 11    omeprazole (PRILOSEC) 40 MG DR capsule Take 1 capsule (40 mg) by mouth daily 30 capsule 3    valACYclovir HCl (VALTREX PO)          Allergies   Allergen Reactions    Seasonal Allergies         Social History     Tobacco Use    Smoking status: Former     Packs/day: 1.00     Years: 15.00     Additional pack years: 0.00     Total pack years: 15.00     Types: Cigars, Cigarettes     Start date: 1985     Quit date: 2003     Years since quittin.2    Smokeless tobacco: Never    Tobacco comments:     smoked cigars every now and then   Substance Use Topics    Alcohol use: Yes     Family History   Problem Relation Age of Onset    Diabetes Father      History   Drug Use Unknown         Review of Systems    Review of Systems  CONSTITUTIONAL: NEGATIVE for fever, chills, change in weight  INTEGUMENTARY/SKIN: NEGATIVE for worrisome rashes, moles or lesions  EYES: NEGATIVE for vision changes or irritation  ENT/MOUTH: NEGATIVE for ear, mouth and throat problems  RESP: NEGATIVE for significant cough or SOB  BREAST: NEGATIVE for masses, tenderness or discharge  CV: NEGATIVE for chest pain, palpitations or peripheral edema  GI: NEGATIVE for nausea, abdominal pain, heartburn, or change in bowel habits  : NEGATIVE  "for frequency, dysuria, or hematuria  MUSCULOSKELETAL: NEGATIVE for significant arthralgias or myalgia  NEURO: NEGATIVE for weakness, dizziness or paresthesias  ENDOCRINE: NEGATIVE for temperature intolerance, skin/hair changes  HEME: NEGATIVE for bleeding problems  PSYCHIATRIC: NEGATIVE for changes in mood or affect    Objective    BP (!) 152/90   Pulse 74   Temp 97.5  F (36.4  C) (Tympanic)   Wt 80.7 kg (178 lb)   SpO2 98%   BMI 24.83 kg/m     Estimated body mass index is 24.83 kg/m  as calculated from the following:    Height as of 9/19/23: 1.803 m (5' 11\").    Weight as of this encounter: 80.7 kg (178 lb).  Physical Exam  GENERAL: alert and no distress  EYES: Eyes grossly normal to inspection, PERRL and conjunctivae and sclerae normal  HENT: ear canals and TM's normal, nose and mouth without ulcers or lesions  NECK: no adenopathy, no asymmetry, masses, or scars  RESP: lungs clear to auscultation - no rales, rhonchi or wheezes  CV: regular rate and rhythm, normal S1 S2, no S3 or S4, no murmur, click or rub, no peripheral edema  ABDOMEN: soft, nontender, no hepatosplenomegaly, no masses and bowel sounds normal  MS: no gross musculoskeletal defects noted, no edema  SKIN: no suspicious lesions or rashes  NEURO: Normal strength and tone, mentation intact and speech normal  PSYCH: mentation appears normal, affect normal/bright    Recent Labs   Lab Test 08/01/23  0845   HGB 17.3           Diagnostics  Cbc normal.  Bmp pending.     EKG: appears normal, NSR, normal axis, normal intervals, no acute ST/T changes c/w ischemia, no LVH by voltage criteria, occasional PVC noted, unifocal, unchanged from previous tracings    Revised Cardiac Risk Index (RCRI)  The patient has the following serious cardiovascular risks for perioperative complications:   - No serious cardiac risks = 0 points     RCRI Interpretation: 0 points: Class I (very low risk - 0.4% complication rate)         Signed Electronically by: Anant LARA" MD Nathan  Copy of this evaluation report is provided to requesting physician.

## 2024-03-25 ENCOUNTER — TELEPHONE (OUTPATIENT)
Dept: FAMILY MEDICINE | Facility: OTHER | Age: 64
End: 2024-03-25

## 2024-03-25 ENCOUNTER — OFFICE VISIT (OUTPATIENT)
Dept: FAMILY MEDICINE | Facility: OTHER | Age: 64
End: 2024-03-25
Attending: FAMILY MEDICINE
Payer: COMMERCIAL

## 2024-03-25 VITALS
OXYGEN SATURATION: 98 % | WEIGHT: 178 LBS | TEMPERATURE: 97.5 F | BODY MASS INDEX: 24.83 KG/M2 | SYSTOLIC BLOOD PRESSURE: 138 MMHG | DIASTOLIC BLOOD PRESSURE: 84 MMHG | HEART RATE: 74 BPM

## 2024-03-25 DIAGNOSIS — G89.29 CHRONIC PAIN OF RIGHT KNEE: ICD-10-CM

## 2024-03-25 DIAGNOSIS — M25.561 CHRONIC PAIN OF RIGHT KNEE: ICD-10-CM

## 2024-03-25 DIAGNOSIS — Z01.818 PREOPERATIVE EXAMINATION: Primary | ICD-10-CM

## 2024-03-25 DIAGNOSIS — J06.9 UPPER RESPIRATORY TRACT INFECTION, UNSPECIFIED TYPE: ICD-10-CM

## 2024-03-25 LAB
ANION GAP SERPL CALCULATED.3IONS-SCNC: 9 MMOL/L (ref 7–15)
ATRIAL RATE - MUSE: 78 BPM
BASOPHILS # BLD AUTO: 0 10E3/UL (ref 0–0.2)
BASOPHILS NFR BLD AUTO: 0 %
BUN SERPL-MCNC: 18.4 MG/DL (ref 8–23)
CALCIUM SERPL-MCNC: 9.5 MG/DL (ref 8.8–10.2)
CHLORIDE SERPL-SCNC: 103 MMOL/L (ref 98–107)
CREAT SERPL-MCNC: 0.95 MG/DL (ref 0.67–1.17)
DEPRECATED HCO3 PLAS-SCNC: 26 MMOL/L (ref 22–29)
DIASTOLIC BLOOD PRESSURE - MUSE: NORMAL MMHG
EGFRCR SERPLBLD CKD-EPI 2021: 89 ML/MIN/1.73M2
EOSINOPHIL # BLD AUTO: 0 10E3/UL (ref 0–0.7)
EOSINOPHIL NFR BLD AUTO: 0 %
ERYTHROCYTE [DISTWIDTH] IN BLOOD BY AUTOMATED COUNT: 12.1 % (ref 10–15)
GLUCOSE SERPL-MCNC: 142 MG/DL (ref 70–99)
HCT VFR BLD AUTO: 47.2 %
HGB BLD-MCNC: 16.5 G/DL
IMM GRANULOCYTES # BLD: 0 10E3/UL
IMM GRANULOCYTES NFR BLD: 0 %
INTERPRETATION ECG - MUSE: NORMAL
LYMPHOCYTES # BLD AUTO: 1.4 10E3/UL (ref 0.8–5.3)
LYMPHOCYTES NFR BLD AUTO: 31 %
MCH RBC QN AUTO: 30.7 PG (ref 26.5–33)
MCHC RBC AUTO-ENTMCNC: 35 G/DL (ref 31.5–36.5)
MCV RBC AUTO: 88 FL (ref 78–100)
MONOCYTES # BLD AUTO: 0.4 10E3/UL (ref 0–1.3)
MONOCYTES NFR BLD AUTO: 8 %
NEUTROPHILS # BLD AUTO: 2.7 10E3/UL (ref 1.6–8.3)
NEUTROPHILS NFR BLD AUTO: 60 %
P AXIS - MUSE: -27 DEGREES
PLATELET # BLD AUTO: 207 10E3/UL (ref 150–450)
POTASSIUM SERPL-SCNC: 4 MMOL/L (ref 3.4–5.3)
PR INTERVAL - MUSE: 154 MS
QRS DURATION - MUSE: 92 MS
QT - MUSE: 380 MS
QTC - MUSE: 433 MS
R AXIS - MUSE: 4 DEGREES
RBC # BLD AUTO: 5.37 10E6/UL
SODIUM SERPL-SCNC: 138 MMOL/L (ref 135–145)
SYSTOLIC BLOOD PRESSURE - MUSE: NORMAL MMHG
T AXIS - MUSE: 56 DEGREES
VENTRICULAR RATE- MUSE: 78 BPM
WBC # BLD AUTO: 4.5 10E3/UL (ref 4–11)

## 2024-03-25 PROCEDURE — 85025 COMPLETE CBC W/AUTO DIFF WBC: CPT | Performed by: FAMILY MEDICINE

## 2024-03-25 PROCEDURE — 99213 OFFICE O/P EST LOW 20 MIN: CPT | Performed by: FAMILY MEDICINE

## 2024-03-25 PROCEDURE — 93000 ELECTROCARDIOGRAM COMPLETE: CPT | Mod: 77 | Performed by: INTERNAL MEDICINE

## 2024-03-25 PROCEDURE — 36415 COLL VENOUS BLD VENIPUNCTURE: CPT | Performed by: FAMILY MEDICINE

## 2024-03-25 PROCEDURE — 80048 BASIC METABOLIC PNL TOTAL CA: CPT | Performed by: FAMILY MEDICINE

## 2024-03-25 ASSESSMENT — PAIN SCALES - GENERAL: PAINLEVEL: NO PAIN (0)

## 2024-07-06 ENCOUNTER — HEALTH MAINTENANCE LETTER (OUTPATIENT)
Age: 64
End: 2024-07-06

## 2024-10-02 ENCOUNTER — LAB (OUTPATIENT)
Dept: LAB | Facility: OTHER | Age: 64
End: 2024-10-02
Payer: COMMERCIAL

## 2024-10-02 DIAGNOSIS — Z13.220 LIPID SCREENING: ICD-10-CM

## 2024-10-02 DIAGNOSIS — Z12.5 PROSTATE CANCER SCREENING: ICD-10-CM

## 2024-10-02 LAB
ALBUMIN SERPL BCG-MCNC: 4.3 G/DL (ref 3.5–5.2)
ALP SERPL-CCNC: 67 U/L (ref 40–150)
ALT SERPL W P-5'-P-CCNC: 21 U/L (ref 0–70)
ANION GAP SERPL CALCULATED.3IONS-SCNC: 12 MMOL/L (ref 7–15)
AST SERPL W P-5'-P-CCNC: 25 U/L (ref 0–45)
BILIRUB SERPL-MCNC: 0.5 MG/DL
BUN SERPL-MCNC: 17.7 MG/DL (ref 8–23)
CALCIUM SERPL-MCNC: 9.9 MG/DL (ref 8.8–10.4)
CHLORIDE SERPL-SCNC: 102 MMOL/L (ref 98–107)
CHOLEST SERPL-MCNC: 259 MG/DL
CREAT SERPL-MCNC: 1.13 MG/DL (ref 0.67–1.17)
EGFRCR SERPLBLD CKD-EPI 2021: 73 ML/MIN/1.73M2
FASTING STATUS PATIENT QL REPORTED: ABNORMAL
GLUCOSE SERPL-MCNC: 108 MG/DL (ref 70–99)
HCO3 SERPL-SCNC: 25 MMOL/L (ref 22–29)
HDLC SERPL-MCNC: 63 MG/DL
HOLD SPECIMEN: NORMAL
LDLC SERPL CALC-MCNC: 180 MG/DL
NONHDLC SERPL-MCNC: 196 MG/DL
POTASSIUM SERPL-SCNC: 4.1 MMOL/L (ref 3.4–5.3)
PROT SERPL-MCNC: 7.4 G/DL (ref 6.4–8.3)
PSA SERPL DL<=0.01 NG/ML-MCNC: 0.64 NG/ML (ref 0–4.5)
SODIUM SERPL-SCNC: 139 MMOL/L (ref 135–145)
TRIGL SERPL-MCNC: 79 MG/DL

## 2024-10-02 PROCEDURE — G0103 PSA SCREENING: HCPCS

## 2024-10-02 PROCEDURE — 80061 LIPID PANEL: CPT

## 2024-10-02 PROCEDURE — 80053 COMPREHEN METABOLIC PANEL: CPT

## 2024-10-02 PROCEDURE — 36415 COLL VENOUS BLD VENIPUNCTURE: CPT

## 2024-10-02 SDOH — HEALTH STABILITY: PHYSICAL HEALTH: ON AVERAGE, HOW MANY MINUTES DO YOU ENGAGE IN EXERCISE AT THIS LEVEL?: 100 MIN

## 2024-10-02 SDOH — HEALTH STABILITY: PHYSICAL HEALTH: ON AVERAGE, HOW MANY DAYS PER WEEK DO YOU ENGAGE IN MODERATE TO STRENUOUS EXERCISE (LIKE A BRISK WALK)?: 7 DAYS

## 2024-10-02 ASSESSMENT — SOCIAL DETERMINANTS OF HEALTH (SDOH): HOW OFTEN DO YOU GET TOGETHER WITH FRIENDS OR RELATIVES?: PATIENT DECLINED

## 2024-10-02 NOTE — PROGRESS NOTES
"Preventive Care Visit  [facility] [place]  Anant Evans MD, Family Medicine  Oct 7, 2024      Assessment & Plan     Lipid screening  Stable but creeping.  Politely declines statin.  He is working on diet/ex and recheck next year.  The sugar crept up as well.    - Comprehensive metabolic panel; Future  - Lipid Profile; Future    Prostate cancer screening  Update stable.    - Prostate Specific Antigen Screen; Future    Routine general medical examination at a health care facility  Nice review.  Routine cares and follow.      Hyperlipidemia, unspecified hyperlipidemia type  As above.  Review the labs which got worse this last year.  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  He is going to stop that.      Rash and nonspecific skin eruption  Right cheek.  Intermittent scale.  Consider derm.  He will monitor as it hasn't changed for years and will let me know if it changes.      Skin lesion  History of.  Update.    - imiquimod (ALDARA) 5 % external cream; Apply topically twice a week.            BMI  Estimated body mass index is 25.1 kg/m  as calculated from the following:    Height as of this encounter: 1.803 m (5' 11\").    Weight as of this encounter: 81.6 kg (180 lb).       Counseling  Appropriate preventive services were addressed with this patient via screening, questionnaire, or discussion as appropriate for fall prevention, nutrition, physical activity, Tobacco-use cessation, social engagement, weight loss and cognition.  Checklist reviewing preventive services available has been given to the patient.  Reviewed patient's diet, addressing concerns and/or questions.           No follow-ups on file.    Myles Otto is a 64 year old, presenting for the following:  Physical        10/7/2024     8:17 AM   Additional Questions   Roomed by Linda   Accompanied by self        Health Care Directive  Patient does not have a Health Care Directive or Living Will: Patient states has " Advance Directive and will bring in a copy to clinic.    HPI            10/2/2024   General Health   How would you rate your overall physical health? Good   Feel stress (tense, anxious, or unable to sleep) To some extent      (!) STRESS CONCERN      10/2/2024   Nutrition   Three or more servings of calcium each day? Yes   Diet: Regular (no restrictions)   How many servings of fruit and vegetables per day? (!) 2-3   How many sweetened beverages each day? 0-1            10/2/2024   Exercise   Days per week of moderate/strenous exercise 7 days   Average minutes spent exercising at this level 100 min            10/2/2024   Social Factors   Frequency of gathering with friends or relatives Patient declined   Worry food won't last until get money to buy more Patient declined   Food not last or not have enough money for food? Patient declined   Do you have housing? (Housing is defined as stable permanent housing and does not include staying ouside in a car, in a tent, in an abandoned building, in an overnight shelter, or couch-surfing.) Patient declined   Are you worried about losing your housing? Patient declined   Lack of transportation? Patient declined   Unable to get utilities (heat,electricity)? Patient declined            10/2/2024   Fall Risk   Fallen 2 or more times in the past year? No   Trouble with walking or balance? No             10/2/2024   Dental   Dentist two times every year? Yes            10/2/2024   TB Screening   Were you born outside of the [place]? No            Today's PHQ-2 Score:       10/2/2024    10:03 AM   PHQ-2 ( 1999 [employer])   Q1: Little interest or pleasure in doing things 0   Q2: Feeling down, depressed or hopeless 0   PHQ-2 Score 0   Q1: Little interest or pleasure in doing things Not at all   Q2: Feeling down, depressed or hopeless Not at all   PHQ-2 Score 0           10/2/2024   Substance Use   Alcohol more than 3/day or more than 7/wk Not Applicable   Do you use any other substances  recreationally? (!) DECLINE        Social History     Tobacco Use    Smoking status: Former     Current packs/day: 0.00     Average packs/day: 1 pack/day for 18.0 years (18.0 ttl pk-yrs)     Types: Cigars, Cigarettes     Start date: 1985     Quit date: 2003     Years since quittin.7    Smokeless tobacco: Never    Tobacco comments:     smoked cigars every now and then   Substance Use Topics    Alcohol use: Yes    Drug use: Never           10/2/2024   STI Screening   New sexual partner(s) since last STI/HIV test? (!) DECLINE      Last PSA:   PSA   Date Value Ref Range Status   2019 0.48 0 - 4 ug/L Final     Comment:     Assay Method:  Chemiluminescence using Siemens Vista analyzer     Prostate Specific Antigen Screen   Date Value Ref Range Status   10/02/2024 0.64 0.00 - 4.50 ng/mL Final   2022 0.66 0.00 - 4.00 ug/L Final     ASCVD Risk   The 10-year ASCVD risk score (Felix NELSON, et al., 2019) is: 11.2%    Values used to calculate the score:      Age: 64 years      Sex: Male      Is Non- : No      Diabetic: No      Tobacco smoker: No      Systolic Blood Pressure: 120 mmHg      Is BP treated: No      HDL Cholesterol: 63 mg/dL      Total Cholesterol: 259 mg/dL           Reviewed and updated as needed this visit by Provider                    No past medical history on file.  Past Surgical History:   Procedure Laterality Date    ARTHROPLASTY SHOULDER Left 2012    ARTHROPLASTY SHOULDER Left 2019    Removal of cement spacer, Dr Russell    ARTHROSCOPY KNEE Right 10/23/2000    AS REPAIR OF NASAL SEPTUM      COLONOSCOPY  2010    Dr Narayanan, repeat 2020    ENDOSCOPY UPPER, COLONOSCOPY, COMBINED N/A 2023    Procedure: COLONOSCOPY AND UPPER GASTROINTESTINAL TRACT ENDOSCOPY WITH BIOPSY;  Surgeon: Jaren Aguilar MD;  Location: [facility]         Review of Systems  CONSTITUTIONAL: NEGATIVE for fever, chills, change in weight  INTEGUMENTARY/SKIN: NEGATIVE  "for worrisome rashes, moles or lesions  EYES: NEGATIVE for vision changes or irritation  ENT/MOUTH: NEGATIVE for ear, mouth and throat problems  RESP: NEGATIVE for significant cough or SOB  BREAST: NEGATIVE for masses, tenderness or discharge  CV: NEGATIVE for chest pain, palpitations or peripheral edema  GI: NEGATIVE for nausea, abdominal pain, heartburn, or change in bowel habits  : NEGATIVE for frequency, dysuria, or hematuria  MUSCULOSKELETAL: NEGATIVE for significant arthralgias or myalgia  NEURO: NEGATIVE for weakness, dizziness or paresthesias  ENDOCRINE: NEGATIVE for temperature intolerance, skin/hair changes  HEME: NEGATIVE for bleeding problems  PSYCHIATRIC: NEGATIVE for changes in mood or affect     Objective    Exam  /78   Pulse 61   Temp 97  F (36.1  C) (Tympanic)   Ht 1.803 m (5' 11\")   Wt 81.6 kg (180 lb)   SpO2 98%   BMI 25.10 kg/m     Estimated body mass index is 25.1 kg/m  as calculated from the following:    Height as of this encounter: 1.803 m (5' 11\").    Weight as of this encounter: 81.6 kg (180 lb).    Physical Exam  GENERAL: alert and no distress  EYES: Eyes grossly normal to inspection, PERRL and conjunctivae and sclerae normal  HENT: ear canals and TM's normal, nose and mouth without ulcers or lesions  NECK: no adenopathy, no asymmetry, masses, or scars  RESP: lungs clear to auscultation - no rales, rhonchi or wheezes  CV: regular rate and rhythm, normal S1 S2, no S3 or S4, no murmur, click or rub, no peripheral edema  ABDOMEN: soft, nontender, no hepatosplenomegaly, no masses and bowel sounds normal   (male): normal male genitalia without lesions or urethral discharge, no hernia  RECTAL: normal sphincter tone, no rectal masses, prostate normal size, smooth, nontender without nodules or masses  MS: no gross musculoskeletal defects noted, no edema  SKIN: no suspicious lesions or rashes  NEURO: Normal strength and tone, mentation intact and speech normal  PSYCH: mentation " appears normal, affect normal/bright            Signed Electronically by: Anant Evans MD

## 2024-10-07 ENCOUNTER — OFFICE VISIT (OUTPATIENT)
Dept: FAMILY MEDICINE | Facility: OTHER | Age: 64
End: 2024-10-07
Attending: FAMILY MEDICINE
Payer: COMMERCIAL

## 2024-10-07 VITALS
HEART RATE: 61 BPM | DIASTOLIC BLOOD PRESSURE: 78 MMHG | BODY MASS INDEX: 25.2 KG/M2 | WEIGHT: 180 LBS | SYSTOLIC BLOOD PRESSURE: 120 MMHG | TEMPERATURE: 97 F | OXYGEN SATURATION: 98 % | HEIGHT: 71 IN

## 2024-10-07 DIAGNOSIS — E78.5 HYPERLIPIDEMIA, UNSPECIFIED HYPERLIPIDEMIA TYPE: ICD-10-CM

## 2024-10-07 DIAGNOSIS — Z00.00 ROUTINE GENERAL MEDICAL EXAMINATION AT A HEALTH CARE FACILITY: Primary | ICD-10-CM

## 2024-10-07 DIAGNOSIS — L98.9 SKIN LESION: ICD-10-CM

## 2024-10-07 DIAGNOSIS — Z13.220 LIPID SCREENING: ICD-10-CM

## 2024-10-07 DIAGNOSIS — Z12.5 PROSTATE CANCER SCREENING: ICD-10-CM

## 2024-10-07 DIAGNOSIS — R21 RASH AND NONSPECIFIC SKIN ERUPTION: ICD-10-CM

## 2024-10-07 PROCEDURE — 99396 PREV VISIT EST AGE 40-64: CPT | Performed by: FAMILY MEDICINE

## 2024-10-07 PROCEDURE — 99213 OFFICE O/P EST LOW 20 MIN: CPT | Mod: 25 | Performed by: FAMILY MEDICINE

## 2024-10-07 RX ORDER — IMIQUIMOD 12.5 MG/.25G
CREAM TOPICAL
Qty: 24 EACH | Refills: 11 | Status: SHIPPED | OUTPATIENT
Start: 2024-10-07

## 2024-10-07 ASSESSMENT — PAIN SCALES - GENERAL: PAINLEVEL: NO PAIN (0)

## 2024-10-14 DIAGNOSIS — L98.9 SKIN LESION: ICD-10-CM

## 2024-10-14 RX ORDER — IMIQUIMOD 12.5 MG/.25G
CREAM TOPICAL
Refills: 11 | OUTPATIENT
Start: 2024-10-14

## 2024-10-14 NOTE — TELEPHONE ENCOUNTER
Rhonda      Last Written Prescription Date:  10/7/24  Last Fill Quantity: 24,   # refills: 11  Last Office Visit: 10/7/24  Future Office visit:       Routing refill request to provider for review/approval because:

## (undated) DEVICE — FORCEPS BIOPSY RADIAL JAW 4 LARGE W/NEEDLE 240CM M00513332

## (undated) DEVICE — CANISTER SUCTION MEDI-VAC GUARDIAN 2000ML 90D 65651-220

## (undated) DEVICE — CONNECTOR ERBEFLO 2 PORT 20325-215

## (undated) DEVICE — SOL WATER IRRIG 1000ML BOTTLE 2F7114

## (undated) DEVICE — TUBING SUCTION 20FT N620A

## (undated) RX ORDER — PROPOFOL 10 MG/ML
INJECTION, EMULSION INTRAVENOUS
Status: DISPENSED
Start: 2023-09-19